# Patient Record
Sex: FEMALE | NOT HISPANIC OR LATINO | Employment: UNEMPLOYED | URBAN - METROPOLITAN AREA
[De-identification: names, ages, dates, MRNs, and addresses within clinical notes are randomized per-mention and may not be internally consistent; named-entity substitution may affect disease eponyms.]

---

## 2023-12-22 ENCOUNTER — ANESTHESIA EVENT (OUTPATIENT)
Dept: SURGERY | Facility: CLINIC | Age: 38
End: 2023-12-22
Payer: COMMERCIAL

## 2023-12-22 ENCOUNTER — HOSPITAL ENCOUNTER (OUTPATIENT)
Facility: CLINIC | Age: 38
Discharge: HOME | End: 2023-12-22
Attending: COLON & RECTAL SURGERY | Admitting: COLON & RECTAL SURGERY
Payer: COMMERCIAL

## 2023-12-22 ENCOUNTER — ANESTHESIA (OUTPATIENT)
Dept: SURGERY | Facility: CLINIC | Age: 38
End: 2023-12-22
Payer: COMMERCIAL

## 2023-12-22 VITALS
HEART RATE: 76 BPM | WEIGHT: 136.1 LBS | BODY MASS INDEX: 23.23 KG/M2 | SYSTOLIC BLOOD PRESSURE: 106 MMHG | DIASTOLIC BLOOD PRESSURE: 66 MMHG | RESPIRATION RATE: 13 BRPM | HEIGHT: 64 IN | OXYGEN SATURATION: 99 % | TEMPERATURE: 98.5 F

## 2023-12-22 DIAGNOSIS — K61.0 PERIANAL ABSCESS: Primary | ICD-10-CM

## 2023-12-22 LAB
GRAM STAIN RESULT: ABNORMAL

## 2023-12-22 PROCEDURE — 250N000009 HC RX 250: Performed by: NURSE ANESTHETIST, CERTIFIED REGISTERED

## 2023-12-22 PROCEDURE — 258N000003 HC RX IP 258 OP 636: Performed by: NURSE ANESTHETIST, CERTIFIED REGISTERED

## 2023-12-22 PROCEDURE — 370N000017 HC ANESTHESIA TECHNICAL FEE, PER MIN: Performed by: COLON & RECTAL SURGERY

## 2023-12-22 PROCEDURE — 87075 CULTR BACTERIA EXCEPT BLOOD: CPT | Performed by: COLON & RECTAL SURGERY

## 2023-12-22 PROCEDURE — 250N000009 HC RX 250: Performed by: COLON & RECTAL SURGERY

## 2023-12-22 PROCEDURE — 250N000025 HC SEVOFLURANE, PER MIN: Performed by: COLON & RECTAL SURGERY

## 2023-12-22 PROCEDURE — 999N000141 HC STATISTIC PRE-PROCEDURE NURSING ASSESSMENT: Performed by: COLON & RECTAL SURGERY

## 2023-12-22 PROCEDURE — 272N000001 HC OR GENERAL SUPPLY STERILE: Performed by: COLON & RECTAL SURGERY

## 2023-12-22 PROCEDURE — 250N000011 HC RX IP 250 OP 636: Performed by: NURSE ANESTHETIST, CERTIFIED REGISTERED

## 2023-12-22 PROCEDURE — 87070 CULTURE OTHR SPECIMN AEROBIC: CPT | Performed by: COLON & RECTAL SURGERY

## 2023-12-22 PROCEDURE — 360N000075 HC SURGERY LEVEL 2, PER MIN: Performed by: COLON & RECTAL SURGERY

## 2023-12-22 PROCEDURE — 87077 CULTURE AEROBIC IDENTIFY: CPT | Performed by: COLON & RECTAL SURGERY

## 2023-12-22 PROCEDURE — 87205 SMEAR GRAM STAIN: CPT | Performed by: COLON & RECTAL SURGERY

## 2023-12-22 PROCEDURE — 250N000011 HC RX IP 250 OP 636: Performed by: COLON & RECTAL SURGERY

## 2023-12-22 PROCEDURE — 250N000013 HC RX MED GY IP 250 OP 250 PS 637: Performed by: COLON & RECTAL SURGERY

## 2023-12-22 PROCEDURE — 710N000009 HC RECOVERY PHASE 1, LEVEL 1, PER MIN: Performed by: COLON & RECTAL SURGERY

## 2023-12-22 PROCEDURE — 710N000012 HC RECOVERY PHASE 2, PER MINUTE: Performed by: COLON & RECTAL SURGERY

## 2023-12-22 RX ORDER — ONDANSETRON 4 MG/1
4 TABLET, ORALLY DISINTEGRATING ORAL EVERY 30 MIN PRN
Status: DISCONTINUED | OUTPATIENT
Start: 2023-12-22 | End: 2023-12-22 | Stop reason: HOSPADM

## 2023-12-22 RX ORDER — HYDROMORPHONE HCL IN WATER/PF 6 MG/30 ML
0.4 PATIENT CONTROLLED ANALGESIA SYRINGE INTRAVENOUS EVERY 5 MIN PRN
Status: DISCONTINUED | OUTPATIENT
Start: 2023-12-22 | End: 2023-12-22 | Stop reason: HOSPADM

## 2023-12-22 RX ORDER — OXYCODONE HYDROCHLORIDE 5 MG/1
5 TABLET ORAL EVERY 4 HOURS PRN
Qty: 10 TABLET | Refills: 0 | Status: SHIPPED | OUTPATIENT
Start: 2023-12-22

## 2023-12-22 RX ORDER — BUPIVACAINE HYDROCHLORIDE 2.5 MG/ML
INJECTION, SOLUTION INFILTRATION; PERINEURAL PRN
Status: DISCONTINUED | OUTPATIENT
Start: 2023-12-22 | End: 2023-12-22 | Stop reason: HOSPADM

## 2023-12-22 RX ORDER — ONDANSETRON 2 MG/ML
INJECTION INTRAMUSCULAR; INTRAVENOUS PRN
Status: DISCONTINUED | OUTPATIENT
Start: 2023-12-22 | End: 2023-12-22

## 2023-12-22 RX ORDER — SODIUM CHLORIDE, SODIUM LACTATE, POTASSIUM CHLORIDE, CALCIUM CHLORIDE 600; 310; 30; 20 MG/100ML; MG/100ML; MG/100ML; MG/100ML
INJECTION, SOLUTION INTRAVENOUS CONTINUOUS
Status: DISCONTINUED | OUTPATIENT
Start: 2023-12-22 | End: 2023-12-22 | Stop reason: HOSPADM

## 2023-12-22 RX ORDER — OXYCODONE HYDROCHLORIDE 5 MG/1
5 CAPSULE ORAL EVERY 4 HOURS PRN
Status: ON HOLD | COMMUNITY
End: 2023-12-22

## 2023-12-22 RX ORDER — FENTANYL CITRATE 50 UG/ML
25 INJECTION, SOLUTION INTRAMUSCULAR; INTRAVENOUS EVERY 5 MIN PRN
Status: DISCONTINUED | OUTPATIENT
Start: 2023-12-22 | End: 2023-12-22 | Stop reason: HOSPADM

## 2023-12-22 RX ORDER — DEXAMETHASONE SODIUM PHOSPHATE 4 MG/ML
INJECTION, SOLUTION INTRA-ARTICULAR; INTRALESIONAL; INTRAMUSCULAR; INTRAVENOUS; SOFT TISSUE PRN
Status: DISCONTINUED | OUTPATIENT
Start: 2023-12-22 | End: 2023-12-22

## 2023-12-22 RX ORDER — HYDROMORPHONE HCL IN WATER/PF 6 MG/30 ML
0.2 PATIENT CONTROLLED ANALGESIA SYRINGE INTRAVENOUS EVERY 5 MIN PRN
Status: DISCONTINUED | OUTPATIENT
Start: 2023-12-22 | End: 2023-12-22 | Stop reason: HOSPADM

## 2023-12-22 RX ORDER — OXYCODONE HYDROCHLORIDE 5 MG/1
5 TABLET ORAL
Status: COMPLETED | OUTPATIENT
Start: 2023-12-22 | End: 2023-12-22

## 2023-12-22 RX ORDER — FENTANYL CITRATE 50 UG/ML
INJECTION, SOLUTION INTRAMUSCULAR; INTRAVENOUS PRN
Status: DISCONTINUED | OUTPATIENT
Start: 2023-12-22 | End: 2023-12-22

## 2023-12-22 RX ORDER — ONDANSETRON 2 MG/ML
4 INJECTION INTRAMUSCULAR; INTRAVENOUS EVERY 30 MIN PRN
Status: DISCONTINUED | OUTPATIENT
Start: 2023-12-22 | End: 2023-12-22 | Stop reason: HOSPADM

## 2023-12-22 RX ORDER — LIDOCAINE HYDROCHLORIDE 20 MG/ML
INJECTION, SOLUTION INFILTRATION; PERINEURAL PRN
Status: DISCONTINUED | OUTPATIENT
Start: 2023-12-22 | End: 2023-12-22

## 2023-12-22 RX ORDER — FENTANYL CITRATE 50 UG/ML
50 INJECTION, SOLUTION INTRAMUSCULAR; INTRAVENOUS EVERY 5 MIN PRN
Status: DISCONTINUED | OUTPATIENT
Start: 2023-12-22 | End: 2023-12-22 | Stop reason: HOSPADM

## 2023-12-22 RX ORDER — SODIUM CHLORIDE, SODIUM LACTATE, POTASSIUM CHLORIDE, CALCIUM CHLORIDE 600; 310; 30; 20 MG/100ML; MG/100ML; MG/100ML; MG/100ML
INJECTION, SOLUTION INTRAVENOUS CONTINUOUS PRN
Status: DISCONTINUED | OUTPATIENT
Start: 2023-12-22 | End: 2023-12-22

## 2023-12-22 RX ORDER — MAGNESIUM HYDROXIDE 1200 MG/15ML
LIQUID ORAL PRN
Status: DISCONTINUED | OUTPATIENT
Start: 2023-12-22 | End: 2023-12-22 | Stop reason: HOSPADM

## 2023-12-22 RX ORDER — PROPOFOL 10 MG/ML
INJECTION, EMULSION INTRAVENOUS PRN
Status: DISCONTINUED | OUTPATIENT
Start: 2023-12-22 | End: 2023-12-22

## 2023-12-22 RX ADMIN — LIDOCAINE HYDROCHLORIDE 30 MG: 20 INJECTION, SOLUTION INFILTRATION; PERINEURAL at 15:21

## 2023-12-22 RX ADMIN — FENTANYL CITRATE 100 MCG: 50 INJECTION INTRAMUSCULAR; INTRAVENOUS at 15:21

## 2023-12-22 RX ADMIN — SODIUM CHLORIDE, POTASSIUM CHLORIDE, SODIUM LACTATE AND CALCIUM CHLORIDE: 600; 310; 30; 20 INJECTION, SOLUTION INTRAVENOUS at 15:16

## 2023-12-22 RX ADMIN — ONDANSETRON 4 MG: 2 INJECTION INTRAMUSCULAR; INTRAVENOUS at 15:37

## 2023-12-22 RX ADMIN — Medication 40 MG: at 15:21

## 2023-12-22 RX ADMIN — OXYCODONE HYDROCHLORIDE 5 MG: 5 TABLET ORAL at 18:46

## 2023-12-22 RX ADMIN — MIDAZOLAM 2 MG: 1 INJECTION INTRAMUSCULAR; INTRAVENOUS at 15:16

## 2023-12-22 RX ADMIN — DEXAMETHASONE SODIUM PHOSPHATE 4 MG: 4 INJECTION, SOLUTION INTRA-ARTICULAR; INTRALESIONAL; INTRAMUSCULAR; INTRAVENOUS; SOFT TISSUE at 15:21

## 2023-12-22 RX ADMIN — PROPOFOL 180 MG: 10 INJECTION, EMULSION INTRAVENOUS at 15:21

## 2023-12-22 ASSESSMENT — ACTIVITIES OF DAILY LIVING (ADL)
ADLS_ACUITY_SCORE: 35
ADLS_ACUITY_SCORE: 35

## 2023-12-22 NOTE — OP NOTE
OPERATIVE NOTE    PERIOPERATIVE DIAGNOSIS: Right anterior lateral issue rectal abscess    POSTOPERATIVE DIAGNOSIS: Same     PROCEDURE: Exam under anesthesia, drainage of abscess     SURGEON:  Britni Bond MD     ANESTHESIA: General endotracheal anesthesia and 20 cc of 1% lidocaine with sodium bicarb and 1-100,000 epinephrine mixed with 20 cc of quarter percent Marcaine     INDICATIONS FOR PROCEDURE: Jodee is a 38-year-old woman with a history of some sort of hemorrhoid procedure this was complicated by an abscess and has had recurrent abscess in the right anterior lateral several times.  She was doing well for about a year but 3 days ago began having pain epigastric pain and swelling.  She attempted to drain this herself at home and was unsuccessful.  She was seen in urgent care and recommended see colorectal surgery.  I saw her this morning she had a very sensitive swollen area on the right anterior that she did not tolerate an exam in the office.  I recommended an exam under anesthesia with likely drainage of abscess, possible fistulotomy, possible seton.  We reviewed the risks of bleeding, infection, need for further procedures, alteration of bowel control, and expected recovery.  She understood and wished to proceed.     FINDINGS: There was a roughly 3 cm right anterior abscess.  The purulence was sent for wound culture.     DESCRIPTION OF PROCEDURE: After informed consent was obtained patient was taken the operating room and positioned for intubation on the cart.  She was intubated without difficulty.  She was then positioned on the operating table in the prone jackknife position with pressure points padded.  The perianal region was taped prepped and draped in the usual fashion.  After appropriate timeout palpation revealed a roughly 3 cm abscess in the right anterior quadrant.  There was scarring and slight bruising over this area.  Digital rectal exam did not reveal any evidence of mass or lesion.  There was  no stenosis or masses.  There was fullness on the right anterior.  Caromna bivalve was inserted revealing some slight scarring but no obvious internal opening.    An 18-gauge needle was used to aspirate over the area of maximal induration with easy return of about 2 cc of very thick purulent material.  This was sent for culture.  I then made a roughly 1 x 1 cm cruciate incision which then dropped into the 3 cm cavity.  Gentle pressure cause return of additional thick purulent material.  The wound was irrigated out with several 100 cc of saline.  The effluent was clear.  Gentle probing within the cavity did not reveal any obvious fistula tract.  There was no internal opening or purulence within the anal canal.     We assessed the wound which had excellent hemostasis and no additional purulence was noted.  I then injected a total of 42 cc of the above-mentioned mixture.    Dry gauze dressing was placed externally.    COMPLICATIONS: No immediate complications    SPECIMENS: Abscess cavity fluid for culture.    EBL: Less than 2 cc     Britni Bond MD

## 2023-12-22 NOTE — ANESTHESIA PROCEDURE NOTES
Airway       Patient location during procedure: OR       Procedure Start/Stop Times: 12/22/2023 3:23 PM  Staff -        CRNA: Yosvany Ramos APRN CRNA       Performed By: CRNA  Consent for Airway        Urgency: elective  Indications and Patient Condition       Indications for airway management: tootie-procedural       Induction type:intravenous       Mask difficulty assessment: 1 - vent by mask    Final Airway Details       Final airway type: endotracheal airway       Successful airway: ETT - single and Oral  Endotracheal Airway Details        ETT size (mm): 7.0       Cuffed: yes       Successful intubation technique: direct laryngoscopy       DL Blade Type: Silva 2       Grade View of Cords: 1       Adjucts: stylet       Position: Right       Measured from: gums/teeth       Secured at (cm): 22       Bite block used: None    Post intubation assessment        Placement verified by: capnometry, equal breath sounds and chest rise        Number of attempts at approach: 1       Number of other approaches attempted: 0       Secured with: tape       Ease of procedure: easy       Dentition: Intact    Medication(s) Administered   Medication Administration Time: 12/22/2023 3:23 PM

## 2023-12-22 NOTE — DISCHARGE INSTRUCTIONS
HOME CARE FOLLOWING MINOR SURGERY        DRESSING  Keep dressing dry and in place until your doctor instructs you to remove the dressing.    DRAINAGE  There should be minimal drainage. If bleeding should occur and soaks through the dressing apply a sterile, dry dressing over it and tape in place. If bleeding persists, apply gentle, steady pressure with your hand over the dressing for 5 minutes. If the bleeding does not stop, call your doctor.      NOTIFY YOUR PHYSICIAN IF YOU HAVE ANY OF THE FOLLOWING SYMPTOMS:    Fever greater than 101 degrees  Excessive bleeding or drainage  Disruption of the skin closure  Swelling, redness or excessive tenderness at the site  Severe pain  Drainage that is green, yellow, thick white or has a bad odor    DR. ELSI BOYLE M.D.  CLINIC PHONE NUMBER:  [phone]  [facility]     GENERAL ANESTHESIA OR SEDATION ADULT DISCHARGE INSTRUCTIONS   SPECIAL PRECAUTIONS FOR 24 HOURS AFTER SURGERY    IT IS NOT UNUSUAL TO FEEL LIGHT-HEADED OR FAINT, UP TO 24 HOURS AFTER SURGERY OR WHILE TAKING PAIN MEDICATION.  IF YOU HAVE THESE SYMPTOMS; SIT FOR A FEW MINUTES BEFORE STANDING AND HAVE SOMEONE ASSIST YOU WHEN YOU GET UP TO WALK OR USE THE BATHROOM.    YOU SHOULD REST AND RELAX FOR THE NEXT 24 HOURS AND YOU MUST MAKE ARRANGEMENTS TO HAVE SOMEONE STAY WITH YOU FOR AT LEAST 24 HOURS AFTER YOUR DISCHARGE.  AVOID HAZARDOUS AND STRENUOUS ACTIVITIES.  DO NOT MAKE IMPORTANT DECISIONS FOR 24 HOURS.    DO NOT DRIVE ANY VEHICLE OR OPERATE MECHANICAL EQUIPMENT FOR 24 HOURS FOLLOWING THE END OF YOUR SURGERY.  EVEN THOUGH YOU MAY FEEL NORMAL, YOUR REACTIONS MAY BE AFFECTED BY THE MEDICATION YOU HAVE RECEIVED.    DO NOT DRINK ALCOHOLIC BEVERAGES FOR 24 HOURS FOLLOWING YOUR SURGERY.    DRINK CLEAR LIQUIDS (APPLE JUICE, GINGER ALE, 7-UP, BROTH, ETC.).  PROGRESS TO YOUR REGULAR DIET AS YOU FEEL ABLE.    YOU MAY HAVE A DRY MOUTH, A SORE THROAT, MUSCLES ACHES OR TROUBLE SLEEPING.  THESE SHOULD  GO AWAY AFTER 24 HOURS.    CALL YOUR DOCTOR FOR ANY OF THE FOLLOWING:  SIGNS OF INFECTION (FEVER, GROWING TENDERNESS AT THE SURGERY SITE, A LARGE AMOUNT OF DRAINAGE OR BLEEDING, SEVERE PAIN, FOUL-SMELLING DRAINAGE, REDNESS OR SWELLING.    IT HAS BEEN OVER 8 TO 10 HOURS SINCE SURGERY AND YOU ARE STILL NOT ABLE TO URINATE (PASS WATER).

## 2023-12-22 NOTE — ANESTHESIA PREPROCEDURE EVALUATION
"Anesthesia Pre-Procedure Evaluation    Patient: Jodee Burdick   MRN: [id number] : 1985        Procedure : Procedure(s):  EXAM UNDER ANESTHESIA, DRAINAGE OF RECTAL ABCESS, POSSIBLE SETON  PLACEMENT OF SETON RECTUM          No past medical history on file.   No past surgical history on file.   Not on File   Social History     Tobacco Use    Smoking status: Not on file    Smokeless tobacco: Not on file   Substance Use Topics    Alcohol use: Not on file      Wt Readings from Last 1 Encounters:   23 61.7 kg (136 lb 1.6 oz)        Anesthesia Evaluation   Pt has had prior anesthetic. Type: General.    No history of anesthetic complications       ROS/MED HX  ENT/Pulmonary:  - neg pulmonary ROS     Neurologic:  - neg neurologic ROS     Cardiovascular:  - neg cardiovascular ROS     METS/Exercise Tolerance:     Hematologic:  - neg hematologic  ROS     Musculoskeletal:  - neg musculoskeletal ROS     GI/Hepatic:  - neg GI/hepatic ROS     Renal/Genitourinary:  - neg Renal ROS     Endo:  - neg endo ROS     Psychiatric/Substance Use:  - neg psychiatric ROS     Infectious Disease:  - neg infectious disease ROS     Malignancy:  - neg malignancy ROS     Other:            Physical Exam    Airway        Mallampati: II   TM distance: > 3 FB   Neck ROM: full   Mouth opening: > 3 cm    Respiratory Devices and Support         Dental       (+) Minor Abnormalities - some fillings, tiny chips      Cardiovascular   cardiovascular exam normal          Pulmonary   pulmonary exam normal                OUTSIDE LABS:  CBC: No results found for: \"WBC\", \"HGB\", \"HCT\", \"PLT\"  BMP: No results found for: \"NA\", \"POTASSIUM\", \"CHLORIDE\", \"CO2\", \"BUN\", \"CR\", \"GLC\"  COAGS: No results found for: \"PTT\", \"INR\", \"FIBR\"  POC: No results found for: \"BGM\", \"HCG\", \"HCGS\"  HEPATIC: No results found for: \"ALBUMIN\", \"PROTTOTAL\", \"ALT\", \"AST\", \"GGT\", \"ALKPHOS\", \"BILITOTAL\", \"BILIDIRECT\", \"TASNEEM\"  OTHER: No results found for: \"PH\", \"LACT\", \"A1C\", \"BENI\", " "\"PHOS\", \"MAG\", \"LIPASE\", \"AMYLASE\", \"TSH\", \"T4\", \"T3\", \"CRP\", \"SED\"    Anesthesia Plan    ASA Status:  1       Anesthesia Type: General.     - Airway: ETT   Induction: Intravenous.   Maintenance: Balanced.        Consents    Anesthesia Plan(s) and associated risks, benefits, and realistic alternatives discussed. Questions answered and patient/representative(s) expressed understanding.     - Discussed:     - Discussed with:  Patient      - Extended Intubation/Ventilatory Support Discussed: No.      - Patient is DNR/DNI Status: No     Use of blood products discussed: No .     Postoperative Care    Pain management: IV analgesics, Oral pain medications, Multi-modal analgesia.   PONV prophylaxis: Ondansetron (or other 5HT-3), Dexamethasone or Solumedrol     Comments:               Ray Butt MD    I have reviewed the pertinent notes and labs in the chart from the past 30 days and (re)examined the patient.  Any updates or changes from those notes are reflected in this note.                  "

## 2023-12-22 NOTE — ANESTHESIA POSTPROCEDURE EVALUATION
Patient: Manar S Nasr    Procedure: Procedure(s):  EXAM UNDER ANESTHESIA, DRAINAGE OF RECTAL ABCESS       Anesthesia Type:  General    Note:  Disposition: Outpatient   Postop Pain Control: Uneventful            Sign Out: Well controlled pain   PONV: No   Neuro/Psych: Uneventful            Sign Out: Acceptable/Baseline neuro status   Airway/Respiratory: Uneventful            Sign Out: Acceptable/Baseline resp. status   CV/Hemodynamics: Uneventful            Sign Out: Acceptable CV status; No obvious hypovolemia; No obvious fluid overload   Other NRE: NONE   DID A NON-ROUTINE EVENT OCCUR? No           Last vitals:  Vitals Value Taken Time   /56 12/22/23 1620   Temp     Pulse 78 12/22/23 1623   Resp 17 12/22/23 1623   SpO2 99 % 12/22/23 1623   Vitals shown include unfiled device data.    Electronically Signed By: Ray Butt MD  December 22, 2023  4:24 PM

## 2023-12-22 NOTE — ANESTHESIA CARE TRANSFER NOTE
Patient: Manar S Nasr    Procedure: Procedure(s):  EXAM UNDER ANESTHESIA, DRAINAGE OF RECTAL ABCESS       Diagnosis: Rectal abscess [K61.1]  Diagnosis Additional Information: No value filed.    Anesthesia Type:   General     Note:    Oropharynx: oropharynx clear of all foreign objects  Level of Consciousness: awake  Oxygen Supplementation: face mask  Level of Supplemental Oxygen (L/min / FiO2): 6  Independent Airway: airway patency satisfactory and stable  Dentition: dentition unchanged  Vital Signs Stable: post-procedure vital signs reviewed and stable  Report to RN Given: handoff report given  Patient transferred to: PACU    Handoff Report: Identifed the Patient, Identified the Reponsible Provider, Reviewed the pertinent medical history, Discussed the surgical course, Reviewed Intra-OP anesthesia mangement and issues during anesthesia, Set expectations for post-procedure period and Allowed opportunity for questions and acknowledgement of understanding      Vitals:  Vitals Value Taken Time   BP     Temp     Pulse 77 12/22/23 1548   Resp 32 12/22/23 1548   SpO2 100 % 12/22/23 1548   Vitals shown include unfiled device data.    Electronically Signed By: ASHA Fields CRNA  December 22, 2023  3:50 PM

## 2023-12-24 LAB
BACTERIA ABSC ANAEROBE+AEROBE CULT: ABNORMAL
BACTERIA ABSC ANAEROBE+AEROBE CULT: ABNORMAL
BACTERIA ABSC ANAEROBE+AEROBE CULT: NORMAL

## 2024-01-10 NOTE — H&P
Please see office not from 12/22/23.  Britni Bond MD  [facility]  [address], [address]  [place] [address]  T: [phone]  F: [phone]   [email or web address]

## 2025-01-22 ENCOUNTER — APPOINTMENT (OUTPATIENT)
Dept: CT IMAGING | Facility: CLINIC | Age: 40
End: 2025-01-22
Attending: PHYSICIAN ASSISTANT
Payer: COMMERCIAL

## 2025-01-22 ENCOUNTER — HOSPITAL ENCOUNTER (INPATIENT)
Facility: CLINIC | Age: 40
Setting detail: SURGERY ADMIT
End: 2025-01-22
Attending: SURGERY | Admitting: SURGERY
Payer: COMMERCIAL

## 2025-01-22 ENCOUNTER — HOSPITAL ENCOUNTER (OUTPATIENT)
Facility: CLINIC | Age: 40
Setting detail: OBSERVATION
LOS: 1 days | Discharge: HOME | End: 2025-01-23
Attending: PHYSICIAN ASSISTANT | Admitting: INTERNAL MEDICINE
Payer: COMMERCIAL

## 2025-01-22 ENCOUNTER — ANESTHESIA EVENT (OUTPATIENT)
Dept: SURGERY | Facility: CLINIC | Age: 40
End: 2025-01-22
Payer: COMMERCIAL

## 2025-01-22 ENCOUNTER — ANESTHESIA (OUTPATIENT)
Dept: SURGERY | Facility: CLINIC | Age: 40
End: 2025-01-22
Payer: COMMERCIAL

## 2025-01-22 DIAGNOSIS — K61.1 PERIRECTAL ABSCESS: ICD-10-CM

## 2025-01-22 DIAGNOSIS — R71.8 MICROCYTOSIS: Primary | ICD-10-CM

## 2025-01-22 LAB
ANION GAP SERPL CALCULATED.3IONS-SCNC: 11 MMOL/L (ref 7–15)
BASOPHILS # BLD AUTO: 0 10E3/UL (ref 0–0.2)
BASOPHILS NFR BLD AUTO: 0 %
BUN SERPL-MCNC: 12 MG/DL (ref 6–20)
CALCIUM SERPL-MCNC: 9 MG/DL (ref 8.8–10.4)
CHLORIDE SERPL-SCNC: 103 MMOL/L (ref 98–107)
CREAT SERPL-MCNC: 0.58 MG/DL (ref 0.51–0.95)
EGFRCR SERPLBLD CKD-EPI 2021: >90 ML/MIN/1.73M2
EOSINOPHIL # BLD AUTO: 0.2 10E3/UL (ref 0–0.7)
EOSINOPHIL NFR BLD AUTO: 2 %
ERYTHROCYTE [DISTWIDTH] IN BLOOD BY AUTOMATED COUNT: 16.3 % (ref 10–15)
GLUCOSE SERPL-MCNC: 88 MG/DL (ref 70–99)
HCG UR QL: NEGATIVE
HCO3 SERPL-SCNC: 22 MMOL/L (ref 22–29)
HCT VFR BLD AUTO: 31 % (ref 35–47)
HGB BLD-MCNC: 9.8 G/DL (ref 11.7–15.7)
IMM GRANULOCYTES # BLD: 0 10E3/UL
IMM GRANULOCYTES NFR BLD: 0 %
LYMPHOCYTES # BLD AUTO: 1.9 10E3/UL (ref 0.8–5.3)
LYMPHOCYTES NFR BLD AUTO: 18 %
MCH RBC QN AUTO: 22.5 PG (ref 26.5–33)
MCHC RBC AUTO-ENTMCNC: 31.6 G/DL (ref 31.5–36.5)
MCV RBC AUTO: 71 FL (ref 78–100)
MONOCYTES # BLD AUTO: 0.8 10E3/UL (ref 0–1.3)
MONOCYTES NFR BLD AUTO: 7 %
NEUTROPHILS # BLD AUTO: 7.5 10E3/UL (ref 1.6–8.3)
NEUTROPHILS NFR BLD AUTO: 72 %
NRBC # BLD AUTO: 0 10E3/UL
NRBC BLD AUTO-RTO: 0 /100
PLATELET # BLD AUTO: 187 10E3/UL (ref 150–450)
POTASSIUM SERPL-SCNC: 3.9 MMOL/L (ref 3.4–5.3)
RBC # BLD AUTO: 4.36 10E6/UL (ref 3.8–5.2)
SODIUM SERPL-SCNC: 136 MMOL/L (ref 135–145)
WBC # BLD AUTO: 10.3 10E3/UL (ref 4–11)

## 2025-01-22 PROCEDURE — 82565 ASSAY OF CREATININE: CPT | Performed by: PHYSICIAN ASSISTANT

## 2025-01-22 PROCEDURE — 96374 THER/PROPH/DIAG INJ IV PUSH: CPT

## 2025-01-22 PROCEDURE — 36415 COLL VENOUS BLD VENIPUNCTURE: CPT | Performed by: PHYSICIAN ASSISTANT

## 2025-01-22 PROCEDURE — 81025 URINE PREGNANCY TEST: CPT | Performed by: PHYSICIAN ASSISTANT

## 2025-01-22 PROCEDURE — 88305 TISSUE EXAM BY PATHOLOGIST: CPT | Mod: TC | Performed by: SURGERY

## 2025-01-22 PROCEDURE — 96375 TX/PRO/DX INJ NEW DRUG ADDON: CPT

## 2025-01-22 PROCEDURE — 99285 EMERGENCY DEPT VISIT HI MDM: CPT | Mod: 25

## 2025-01-22 PROCEDURE — 72193 CT PELVIS W/DYE: CPT

## 2025-01-22 PROCEDURE — 272N000001 HC OR GENERAL SUPPLY STERILE: Performed by: SURGERY

## 2025-01-22 PROCEDURE — 250N000011 HC RX IP 250 OP 636: Performed by: NURSE ANESTHETIST, CERTIFIED REGISTERED

## 2025-01-22 PROCEDURE — 370N000017 HC ANESTHESIA TECHNICAL FEE, PER MIN: Performed by: SURGERY

## 2025-01-22 PROCEDURE — 88304 TISSUE EXAM BY PATHOLOGIST: CPT | Mod: 26 | Performed by: PATHOLOGY

## 2025-01-22 PROCEDURE — 85025 COMPLETE CBC W/AUTO DIFF WBC: CPT | Performed by: PHYSICIAN ASSISTANT

## 2025-01-22 PROCEDURE — 96376 TX/PRO/DX INJ SAME DRUG ADON: CPT

## 2025-01-22 PROCEDURE — 250N000009 HC RX 250: Performed by: NURSE ANESTHETIST, CERTIFIED REGISTERED

## 2025-01-22 PROCEDURE — 250N000025 HC SEVOFLURANE, PER MIN: Performed by: SURGERY

## 2025-01-22 PROCEDURE — 99222 1ST HOSP IP/OBS MODERATE 55: CPT | Mod: 57

## 2025-01-22 PROCEDURE — 250N000013 HC RX MED GY IP 250 OP 250 PS 637: Performed by: PHYSICIAN ASSISTANT

## 2025-01-22 PROCEDURE — 360N000075 HC SURGERY LEVEL 2, PER MIN: Performed by: SURGERY

## 2025-01-22 PROCEDURE — 120N000004 HC R&B MS OVERFLOW

## 2025-01-22 PROCEDURE — 250N000011 HC RX IP 250 OP 636: Performed by: PHYSICIAN ASSISTANT

## 2025-01-22 PROCEDURE — 999N000141 HC STATISTIC PRE-PROCEDURE NURSING ASSESSMENT: Performed by: SURGERY

## 2025-01-22 PROCEDURE — 99222 1ST HOSP IP/OBS MODERATE 55: CPT | Performed by: PHYSICIAN ASSISTANT

## 2025-01-22 PROCEDURE — 710N000009 HC RECOVERY PHASE 1, LEVEL 1, PER MIN: Performed by: SURGERY

## 2025-01-22 PROCEDURE — 258N000003 HC RX IP 258 OP 636: Performed by: ANESTHESIOLOGY

## 2025-01-22 PROCEDURE — 250N000011 HC RX IP 250 OP 636: Performed by: ANESTHESIOLOGY

## 2025-01-22 PROCEDURE — 250N000011 HC RX IP 250 OP 636: Performed by: SURGERY

## 2025-01-22 PROCEDURE — 80048 BASIC METABOLIC PNL TOTAL CA: CPT | Performed by: PHYSICIAN ASSISTANT

## 2025-01-22 PROCEDURE — G0378 HOSPITAL OBSERVATION PER HR: HCPCS

## 2025-01-22 PROCEDURE — 258N000003 HC RX IP 258 OP 636: Performed by: PHYSICIAN ASSISTANT

## 2025-01-22 RX ORDER — SODIUM CHLORIDE, SODIUM LACTATE, POTASSIUM CHLORIDE, CALCIUM CHLORIDE 600; 310; 30; 20 MG/100ML; MG/100ML; MG/100ML; MG/100ML
INJECTION, SOLUTION INTRAVENOUS CONTINUOUS
Status: DISCONTINUED | OUTPATIENT
Start: 2025-01-22 | End: 2025-01-23

## 2025-01-22 RX ORDER — IOPAMIDOL 755 MG/ML
500 INJECTION, SOLUTION INTRAVASCULAR ONCE
Status: COMPLETED | OUTPATIENT
Start: 2025-01-22 | End: 2025-01-22

## 2025-01-22 RX ORDER — HYDROMORPHONE HYDROCHLORIDE 1 MG/ML
0.5 INJECTION, SOLUTION INTRAMUSCULAR; INTRAVENOUS; SUBCUTANEOUS EVERY 30 MIN PRN
Status: DISCONTINUED | OUTPATIENT
Start: 2025-01-22 | End: 2025-01-22

## 2025-01-22 RX ORDER — ONDANSETRON 2 MG/ML
4 INJECTION INTRAMUSCULAR; INTRAVENOUS EVERY 6 HOURS PRN
Status: DISCONTINUED | OUTPATIENT
Start: 2025-01-22 | End: 2025-01-23

## 2025-01-22 RX ORDER — ALBUTEROL SULFATE 0.83 MG/ML
2.5 SOLUTION RESPIRATORY (INHALATION) EVERY 4 HOURS PRN
Status: DISCONTINUED | OUTPATIENT
Start: 2025-01-22 | End: 2025-01-23 | Stop reason: HOSPADM

## 2025-01-22 RX ORDER — LIDOCAINE 40 MG/G
CREAM TOPICAL
Status: DISCONTINUED | OUTPATIENT
Start: 2025-01-22 | End: 2025-01-22 | Stop reason: HOSPADM

## 2025-01-22 RX ORDER — AMOXICILLIN 250 MG
1 CAPSULE ORAL 2 TIMES DAILY PRN
Status: DISCONTINUED | OUTPATIENT
Start: 2025-01-22 | End: 2025-01-23 | Stop reason: HOSPADM

## 2025-01-22 RX ORDER — ONDANSETRON 2 MG/ML
INJECTION INTRAMUSCULAR; INTRAVENOUS PRN
Status: DISCONTINUED | OUTPATIENT
Start: 2025-01-22 | End: 2025-01-22

## 2025-01-22 RX ORDER — FENTANYL CITRATE 50 UG/ML
50 INJECTION, SOLUTION INTRAMUSCULAR; INTRAVENOUS EVERY 5 MIN PRN
Status: DISCONTINUED | OUTPATIENT
Start: 2025-01-22 | End: 2025-01-23 | Stop reason: HOSPADM

## 2025-01-22 RX ORDER — HYDROMORPHONE HYDROCHLORIDE 1 MG/ML
0.5 INJECTION, SOLUTION INTRAMUSCULAR; INTRAVENOUS; SUBCUTANEOUS EVERY 5 MIN PRN
Status: DISCONTINUED | OUTPATIENT
Start: 2025-01-22 | End: 2025-01-23 | Stop reason: HOSPADM

## 2025-01-22 RX ORDER — KETOROLAC TROMETHAMINE 15 MG/ML
15 INJECTION, SOLUTION INTRAMUSCULAR; INTRAVENOUS EVERY 6 HOURS
Status: DISCONTINUED | OUTPATIENT
Start: 2025-01-22 | End: 2025-01-23 | Stop reason: HOSPADM

## 2025-01-22 RX ORDER — ONDANSETRON 2 MG/ML
4 INJECTION INTRAMUSCULAR; INTRAVENOUS ONCE
Status: DISCONTINUED | OUTPATIENT
Start: 2025-01-22 | End: 2025-01-22 | Stop reason: HOSPADM

## 2025-01-22 RX ORDER — PROPOFOL 10 MG/ML
INJECTION, EMULSION INTRAVENOUS PRN
Status: DISCONTINUED | OUTPATIENT
Start: 2025-01-22 | End: 2025-01-22

## 2025-01-22 RX ORDER — ACETAMINOPHEN 650 MG/1
650 SUPPOSITORY RECTAL 3 TIMES DAILY
Status: DISCONTINUED | OUTPATIENT
Start: 2025-01-22 | End: 2025-01-22

## 2025-01-22 RX ORDER — HYDRALAZINE HYDROCHLORIDE 20 MG/ML
5-10 INJECTION INTRAMUSCULAR; INTRAVENOUS EVERY 10 MIN PRN
Status: DISCONTINUED | OUTPATIENT
Start: 2025-01-22 | End: 2025-01-23 | Stop reason: HOSPADM

## 2025-01-22 RX ORDER — DEXAMETHASONE SODIUM PHOSPHATE 4 MG/ML
INJECTION, SOLUTION INTRA-ARTICULAR; INTRALESIONAL; INTRAMUSCULAR; INTRAVENOUS; SOFT TISSUE PRN
Status: DISCONTINUED | OUTPATIENT
Start: 2025-01-22 | End: 2025-01-22

## 2025-01-22 RX ORDER — LIDOCAINE 40 MG/G
CREAM TOPICAL
Status: DISCONTINUED | OUTPATIENT
Start: 2025-01-22 | End: 2025-01-23 | Stop reason: HOSPADM

## 2025-01-22 RX ORDER — LABETALOL HYDROCHLORIDE 5 MG/ML
10 INJECTION, SOLUTION INTRAVENOUS
Status: DISCONTINUED | OUTPATIENT
Start: 2025-01-22 | End: 2025-01-23 | Stop reason: HOSPADM

## 2025-01-22 RX ORDER — FENTANYL CITRATE 50 UG/ML
INJECTION, SOLUTION INTRAMUSCULAR; INTRAVENOUS PRN
Status: DISCONTINUED | OUTPATIENT
Start: 2025-01-22 | End: 2025-01-22

## 2025-01-22 RX ORDER — ACETAMINOPHEN 325 MG/1
975 TABLET ORAL 3 TIMES DAILY
Status: DISCONTINUED | OUTPATIENT
Start: 2025-01-22 | End: 2025-01-22

## 2025-01-22 RX ORDER — ACETAMINOPHEN 500 MG
1000 TABLET ORAL EVERY 6 HOURS
Status: DISCONTINUED | OUTPATIENT
Start: 2025-01-22 | End: 2025-01-23 | Stop reason: HOSPADM

## 2025-01-22 RX ORDER — KETOROLAC TROMETHAMINE 30 MG/ML
INJECTION, SOLUTION INTRAMUSCULAR; INTRAVENOUS PRN
Status: DISCONTINUED | OUTPATIENT
Start: 2025-01-22 | End: 2025-01-22

## 2025-01-22 RX ORDER — ONDANSETRON 2 MG/ML
4 INJECTION INTRAMUSCULAR; INTRAVENOUS EVERY 30 MIN PRN
Status: DISCONTINUED | OUTPATIENT
Start: 2025-01-22 | End: 2025-01-23 | Stop reason: HOSPADM

## 2025-01-22 RX ORDER — AMOXICILLIN 250 MG
2 CAPSULE ORAL 2 TIMES DAILY PRN
Status: DISCONTINUED | OUTPATIENT
Start: 2025-01-22 | End: 2025-01-23 | Stop reason: HOSPADM

## 2025-01-22 RX ORDER — HYDROMORPHONE HYDROCHLORIDE 2 MG/1
2 TABLET ORAL EVERY 4 HOURS PRN
Status: DISCONTINUED | OUTPATIENT
Start: 2025-01-22 | End: 2025-01-23 | Stop reason: HOSPADM

## 2025-01-22 RX ORDER — FENTANYL CITRATE 50 UG/ML
25 INJECTION, SOLUTION INTRAMUSCULAR; INTRAVENOUS EVERY 5 MIN PRN
Status: DISCONTINUED | OUTPATIENT
Start: 2025-01-22 | End: 2025-01-23 | Stop reason: HOSPADM

## 2025-01-22 RX ORDER — IBUPROFEN 600 MG/1
600 TABLET, FILM COATED ORAL EVERY 6 HOURS
Status: DISCONTINUED | OUTPATIENT
Start: 2025-01-23 | End: 2025-01-23 | Stop reason: HOSPADM

## 2025-01-22 RX ORDER — NALOXONE HYDROCHLORIDE 0.4 MG/ML
0.1 INJECTION, SOLUTION INTRAMUSCULAR; INTRAVENOUS; SUBCUTANEOUS
Status: DISCONTINUED | OUTPATIENT
Start: 2025-01-22 | End: 2025-01-23 | Stop reason: HOSPADM

## 2025-01-22 RX ORDER — HYDROMORPHONE HCL IN WATER/PF 6 MG/30 ML
0.2 PATIENT CONTROLLED ANALGESIA SYRINGE INTRAVENOUS
Status: DISCONTINUED | OUTPATIENT
Start: 2025-01-22 | End: 2025-01-23 | Stop reason: HOSPADM

## 2025-01-22 RX ORDER — SODIUM CHLORIDE, SODIUM LACTATE, POTASSIUM CHLORIDE, CALCIUM CHLORIDE 600; 310; 30; 20 MG/100ML; MG/100ML; MG/100ML; MG/100ML
INJECTION, SOLUTION INTRAVENOUS CONTINUOUS
Status: DISCONTINUED | OUTPATIENT
Start: 2025-01-22 | End: 2025-01-23 | Stop reason: HOSPADM

## 2025-01-22 RX ORDER — HYDROMORPHONE HCL IN WATER/PF 6 MG/30 ML
0.2 PATIENT CONTROLLED ANALGESIA SYRINGE INTRAVENOUS
Status: DISCONTINUED | OUTPATIENT
Start: 2025-01-22 | End: 2025-01-23

## 2025-01-22 RX ORDER — PIPERACILLIN SODIUM, TAZOBACTAM SODIUM 4; .5 G/20ML; G/20ML
4.5 INJECTION, POWDER, LYOPHILIZED, FOR SOLUTION INTRAVENOUS ONCE
Status: COMPLETED | OUTPATIENT
Start: 2025-01-22 | End: 2025-01-22

## 2025-01-22 RX ORDER — LIDOCAINE HYDROCHLORIDE 20 MG/ML
INJECTION, SOLUTION INFILTRATION; PERINEURAL PRN
Status: DISCONTINUED | OUTPATIENT
Start: 2025-01-22 | End: 2025-01-22

## 2025-01-22 RX ORDER — HYDROMORPHONE HCL IN WATER/PF 6 MG/30 ML
0.4 PATIENT CONTROLLED ANALGESIA SYRINGE INTRAVENOUS
Status: DISCONTINUED | OUTPATIENT
Start: 2025-01-22 | End: 2025-01-23

## 2025-01-22 RX ORDER — HYDROMORPHONE HYDROCHLORIDE 2 MG/1
4 TABLET ORAL EVERY 4 HOURS PRN
Status: DISCONTINUED | OUTPATIENT
Start: 2025-01-22 | End: 2025-01-23 | Stop reason: HOSPADM

## 2025-01-22 RX ORDER — PROCHLORPERAZINE MALEATE 5 MG/1
10 TABLET ORAL EVERY 6 HOURS PRN
Status: DISCONTINUED | OUTPATIENT
Start: 2025-01-22 | End: 2025-01-23 | Stop reason: HOSPADM

## 2025-01-22 RX ORDER — DIPHENHYDRAMINE HYDROCHLORIDE 50 MG/ML
25 INJECTION INTRAMUSCULAR; INTRAVENOUS EVERY 6 HOURS PRN
Status: DISCONTINUED | OUTPATIENT
Start: 2025-01-22 | End: 2025-01-23 | Stop reason: HOSPADM

## 2025-01-22 RX ORDER — SODIUM CHLORIDE 9 MG/ML
INJECTION, SOLUTION INTRAVENOUS CONTINUOUS
Status: DISCONTINUED | OUTPATIENT
Start: 2025-01-22 | End: 2025-01-23

## 2025-01-22 RX ORDER — MAGNESIUM SULFATE HEPTAHYDRATE 40 MG/ML
2 INJECTION, SOLUTION INTRAVENOUS
Status: DISCONTINUED | OUTPATIENT
Start: 2025-01-22 | End: 2025-01-23 | Stop reason: HOSPADM

## 2025-01-22 RX ORDER — KETOROLAC TROMETHAMINE 15 MG/ML
15 INJECTION, SOLUTION INTRAMUSCULAR; INTRAVENOUS
Status: DISCONTINUED | OUTPATIENT
Start: 2025-01-22 | End: 2025-01-23 | Stop reason: HOSPADM

## 2025-01-22 RX ORDER — OXYCODONE HYDROCHLORIDE 5 MG/1
5 TABLET ORAL EVERY 4 HOURS PRN
Status: DISCONTINUED | OUTPATIENT
Start: 2025-01-22 | End: 2025-01-23

## 2025-01-22 RX ORDER — HYDROMORPHONE HCL IN WATER/PF 6 MG/30 ML
0.4 PATIENT CONTROLLED ANALGESIA SYRINGE INTRAVENOUS
Status: DISCONTINUED | OUTPATIENT
Start: 2025-01-22 | End: 2025-01-23 | Stop reason: HOSPADM

## 2025-01-22 RX ORDER — DEXAMETHASONE SODIUM PHOSPHATE 4 MG/ML
4 INJECTION, SOLUTION INTRA-ARTICULAR; INTRALESIONAL; INTRAMUSCULAR; INTRAVENOUS; SOFT TISSUE
Status: DISCONTINUED | OUTPATIENT
Start: 2025-01-22 | End: 2025-01-23 | Stop reason: HOSPADM

## 2025-01-22 RX ORDER — PIPERACILLIN SODIUM, TAZOBACTAM SODIUM 3; .375 G/15ML; G/15ML
3.38 INJECTION, POWDER, LYOPHILIZED, FOR SOLUTION INTRAVENOUS EVERY 6 HOURS
Status: DISCONTINUED | OUTPATIENT
Start: 2025-01-23 | End: 2025-01-23 | Stop reason: HOSPADM

## 2025-01-22 RX ORDER — ONDANSETRON 4 MG/1
4 TABLET, ORALLY DISINTEGRATING ORAL EVERY 30 MIN PRN
Status: DISCONTINUED | OUTPATIENT
Start: 2025-01-22 | End: 2025-01-23 | Stop reason: HOSPADM

## 2025-01-22 RX ORDER — SODIUM CHLORIDE, SODIUM LACTATE, POTASSIUM CHLORIDE, CALCIUM CHLORIDE 600; 310; 30; 20 MG/100ML; MG/100ML; MG/100ML; MG/100ML
INJECTION, SOLUTION INTRAVENOUS CONTINUOUS
Status: DISCONTINUED | OUTPATIENT
Start: 2025-01-22 | End: 2025-01-22 | Stop reason: HOSPADM

## 2025-01-22 RX ORDER — ONDANSETRON 4 MG/1
4 TABLET, ORALLY DISINTEGRATING ORAL EVERY 6 HOURS PRN
Status: DISCONTINUED | OUTPATIENT
Start: 2025-01-22 | End: 2025-01-23

## 2025-01-22 RX ADMIN — DEXAMETHASONE SODIUM PHOSPHATE 8 MG: 4 INJECTION, SOLUTION INTRA-ARTICULAR; INTRALESIONAL; INTRAMUSCULAR; INTRAVENOUS; SOFT TISSUE at 22:11

## 2025-01-22 RX ADMIN — PROPOFOL 120 MG: 10 INJECTION, EMULSION INTRAVENOUS at 22:11

## 2025-01-22 RX ADMIN — SODIUM CHLORIDE: 9 INJECTION, SOLUTION INTRAVENOUS at 18:27

## 2025-01-22 RX ADMIN — ONDANSETRON 4 MG: 2 INJECTION INTRAMUSCULAR; INTRAVENOUS at 22:11

## 2025-01-22 RX ADMIN — HYDROMORPHONE HYDROCHLORIDE 0.4 MG: 0.2 INJECTION, SOLUTION INTRAMUSCULAR; INTRAVENOUS; SUBCUTANEOUS at 19:20

## 2025-01-22 RX ADMIN — HYDROMORPHONE HYDROCHLORIDE 0.5 MG: 1 INJECTION, SOLUTION INTRAMUSCULAR; INTRAVENOUS; SUBCUTANEOUS at 17:41

## 2025-01-22 RX ADMIN — ROCURONIUM BROMIDE 40 MG: 50 INJECTION, SOLUTION INTRAVENOUS at 22:11

## 2025-01-22 RX ADMIN — ONDANSETRON 4 MG: 2 INJECTION INTRAMUSCULAR; INTRAVENOUS at 23:40

## 2025-01-22 RX ADMIN — KETOROLAC TROMETHAMINE 15 MG: 30 INJECTION, SOLUTION INTRAMUSCULAR at 22:38

## 2025-01-22 RX ADMIN — SODIUM CHLORIDE, POTASSIUM CHLORIDE, SODIUM LACTATE AND CALCIUM CHLORIDE: 600; 310; 30; 20 INJECTION, SOLUTION INTRAVENOUS at 22:10

## 2025-01-22 RX ADMIN — HYDROMORPHONE HYDROCHLORIDE 0.5 MG: 1 INJECTION, SOLUTION INTRAMUSCULAR; INTRAVENOUS; SUBCUTANEOUS at 14:40

## 2025-01-22 RX ADMIN — ACETAMINOPHEN 975 MG: 325 TABLET, FILM COATED ORAL at 20:26

## 2025-01-22 RX ADMIN — IOPAMIDOL 63 ML: 755 INJECTION, SOLUTION INTRAVENOUS at 15:20

## 2025-01-22 RX ADMIN — LIDOCAINE HYDROCHLORIDE 50 MG: 20 INJECTION, SOLUTION INFILTRATION; PERINEURAL at 22:11

## 2025-01-22 RX ADMIN — FENTANYL CITRATE 100 MCG: 50 INJECTION INTRAMUSCULAR; INTRAVENOUS at 22:11

## 2025-01-22 RX ADMIN — SUGAMMADEX 200 MG: 100 INJECTION, SOLUTION INTRAVENOUS at 22:39

## 2025-01-22 RX ADMIN — PIPERACILLIN AND TAZOBACTAM 4.5 G: 4; .5 INJECTION, POWDER, FOR SOLUTION INTRAVENOUS at 17:44

## 2025-01-22 ASSESSMENT — COLUMBIA-SUICIDE SEVERITY RATING SCALE - C-SSRS
1. IN THE PAST MONTH, HAVE YOU WISHED YOU WERE DEAD OR WISHED YOU COULD GO TO SLEEP AND NOT WAKE UP?: NO
6. HAVE YOU EVER DONE ANYTHING, STARTED TO DO ANYTHING, OR PREPARED TO DO ANYTHING TO END YOUR LIFE?: NO
2. HAVE YOU ACTUALLY HAD ANY THOUGHTS OF KILLING YOURSELF IN THE PAST MONTH?: NO

## 2025-01-22 ASSESSMENT — ACTIVITIES OF DAILY LIVING (ADL)
ADLS_ACUITY_SCORE: 42
ADLS_ACUITY_SCORE: 42
ADLS_ACUITY_SCORE: 41
ADLS_ACUITY_SCORE: 42
ADLS_ACUITY_SCORE: 41
ADLS_ACUITY_SCORE: 41

## 2025-01-22 ASSESSMENT — LIFESTYLE VARIABLES: TOBACCO_USE: 1

## 2025-01-22 NOTE — ED NOTES
[facility]  ED Nurse Handoff Report    ED Chief complaint: Vaginal Problem  . ED Diagnosis:   Final diagnoses:   Perirectal abscess       Allergies: No Known Allergies    Code Status: Full Code    Activity level - Baseline/Home:  independent.  Activity Level - Current:   independent.   Lift room needed: No.   Bariatric: No   Needed: Yes   Isolation: No.   Infection: Not Applicable.     Respiratory status: Room air    Vital Signs (within 30 minutes):   Vitals:    01/22/25 1219   BP: 114/67   Pulse: 83   Resp: 18   Temp: 98.2  F (36.8  C)   TempSrc: Temporal   SpO2: 98%   Weight: 57 kg (125 lb 10.6 oz)       Cardiac Rhythm:  ,      Pain level:    Patient confused: No.   Patient Falls Risk: nonskid shoes/slippers when out of bed.   Elimination Status: Has voided     Patient Report - Initial Complaint: abscess.   Focused Assessment: 39 year old female with a history of perianal abscess who presents to the emergency department for a perianal problem. The patient states that 2 days ago, she began experiencing perianal pain and swelling, noting that this symptom presentation is reminiscent of a perianal abscess that she has experienced in the past in which she underwent an incision and drainage of this abscess on 12/22/23. She took ibuprofen at 0100 this morning for pain management. No purulent drainage from this abscess. No changes to stool. No black or bloody stools. No fever or chills. No abdominal pain, nausea, or vomiting. No vaginal symptoms. No urinary symptoms. No concern for pregnancy. She adds that she has not eaten today.      Abnormal Results:   Labs Ordered and Resulted from Time of ED Arrival to Time of ED Departure   CBC WITH PLATELETS AND DIFFERENTIAL - Abnormal       Result Value    WBC Count 10.3      RBC Count 4.36      Hemoglobin 9.8 (*)     Hematocrit 31.0 (*)     MCV 71 (*)     MCH 22.5 (*)     MCHC 31.6      RDW 16.3 (*)     Platelet Count 187      % Neutrophils 72       % Lymphocytes 18      % Monocytes 7      % Eosinophils 2      % Basophils 0      % Immature Granulocytes 0      NRBCs per 100 WBC 0      Absolute Neutrophils 7.5      Absolute Lymphocytes 1.9      Absolute Monocytes 0.8      Absolute Eosinophils 0.2      Absolute Basophils 0.0      Absolute Immature Granulocytes 0.0      Absolute NRBCs 0.0     BASIC METABOLIC PANEL - Normal    Sodium 136      Potassium 3.9      Chloride 103      Carbon Dioxide (CO2) 22      Anion Gap 11      Urea Nitrogen 12.0      Creatinine 0.58      GFR Estimate >90      Calcium 9.0      Glucose 88     HCG QUALITATIVE URINE - Normal    hCG Urine Qualitative Negative          CT Pelvis Soft Tissue w Contrast   Final Result   IMPRESSION:   1.  A 2.6 x 2.3 x 1.4 cm right perianal abscess. A linear tract extends superiorly from the abscess and abuts the right upper anal canal/anorectal junction. Consider a follow-up pelvic MRI to evaluate for fistula.             Treatments provided: see EMR  Family Comments: NA  OBS brochure/video discussed/provided to patient:  Yes  ED Medications:   Medications   HYDROmorphone (PF) (DILAUDID) injection 0.5 mg (0.5 mg Intravenous $Given 1/22/25 1440)   piperacillin-tazobactam (ZOSYN) 4.5 g vial to attach to  mL bag (has no administration in time range)   sodium chloride (PF) 0.9% PF flush 100 mL (56 mLs Intravenous $Given 1/22/25 1520)   iopamidol (ISOVUE-370) solution 500 mL (63 mLs Intravenous $Given 1/22/25 1520)       Drips infusing:  No  For the majority of the shift this patient was Green.   Interventions performed were NA.    Sepsis treatment initiated: No    Cares/treatment/interventions/medications to be completed following ED care: See inpatient orders    ED Nurse Name: Destiny Valenzuela RN  4:12 PM     RECEIVING UNIT ED HANDOFF REVIEW    Above ED Nurse Handoff Report was reviewed: Yes  Reviewed by: Arti Randhawa RN on January 22, 2025 at 6:29 PM   HERBER Goldberg called the ED to inform them the note was  read: Yes

## 2025-01-22 NOTE — H&P
[facility]    History and Physical - Hospitalist Service       Date of Admission:  1/22/2025    Assessment & Plan      Jodee Burdick is a 39 year old female with history of perirectal abscess drainage admitted on 1/22/2025 after being sent here by colorectal surgery for rectal pain due to abscess.     In the ED she was afebrile, heart rate 83, pressure 114/67 and breathing comfortably on room air without hypoxia.  Lab work is remarkable for normal BMP, WBC 10.3, hemoglobin 9.8 and platelet count 187.  CT pelvis shows a 2.6 x 2.3 x 1.4 cm right perianal abscess.  Colorectal is aware of her and recommended IV Zosyn and likely will take to the OR for abscess drainage this evening.  Observation admission requested    # Perirectal abscess  # Rectal pain  # History of perirectal abscess  -Patient has a history of perirectal abscess requiring drainage in 2023  -Reports increased rectal pain on 1/20 without bleeding, fever, chills, nausea, vomiting or other signs of systemic infection  -BBC is normal and she is afebrile in the ED  -CT shows a 2.6 x 2.3 x 1.4 cm right perianal abscess  -Colorectal surgery is aware of her and I am told might go to the OR this evening  -IV Zosyn  -Normal saline at 75 ml/hr  -Will have Tylenol, oxycodone and Dilaudid available for pain  -Repeat labs in AM            Diet: NPO per Anesthesia Guidelines for Procedure/Surgery Except for: Meds    DVT Prophylaxis: Pneumatic Compression Devices  Patterson Catheter: Not present  Lines: None     Cardiac Monitoring: None  Code Status:  Full code    Clinically Significant Risk Factors Present on Admission                        # Anemia: based on hgb <11                  Disposition Plan     Medically Ready for Discharge: Anticipated Tomorrow         The patient's care was discussed with the Patient and ED Consultant(s).    Irina Mcclain PA-C  Hospitalist Service  [facility]  Securely message with [facility]  (more info)  Text page via [facility] Paging/Directory     ______________________________________________________________________    Chief Complaint   Rectal pain     History is obtained from the patient    History of Present Illness   Jodee Burdick is a 39 year old female who presents with rectal pain for the past couple days.  The pain is worse with sitting on her buttocks and also when having a bowel movement.  Denies bleeding, fever, chills, nausea, vomiting, abdominal pain, diarrhea and urinary symptoms.  She has felt tired but otherwise does not feel sick.  She denies alcohol use but does smoke occasionally.      Past Medical History    No past medical history on file.    Past Surgical History   Past Surgical History:   Procedure Laterality Date    EXAM UNDER ANESTHESIA RECTUM N/A 12/22/2023    Procedure: Exam under anesthesia rectum;  Surgeon: Britni Bond MD;  Location: [facility] OR    INCISION AND DRAINAGE RECTUM, COMBINED N/A 12/22/2023    Procedure: EXAM UNDER ANESTHESIA, DRAINAGE OF RECTAL ABCESS;  Surgeon: Britni Bond MD;  Location: [facility] OR       Prior to Admission Medications   Prior to Admission Medications   Prescriptions Last Dose Informant Patient Reported? Taking?   oxyCODONE (ROXICODONE) 5 MG tablet   No No   Sig: Take 1 tablet (5 mg) by mouth every 4 hours as needed for moderate to severe pain      Facility-Administered Medications: None          Physical Exam   Vital Signs: Temp: 98.2  F (36.8  C) Temp src: Temporal BP: 114/67 Pulse: 83   Resp: 18 SpO2: 98 % O2 Device: None (Room air)    Weight: 125 lbs 10.6 oz    General Appearance: Alert and oriented x 3  Respiratory: Clear to auscultation bilaterally  Cardiovascular: RRR without murmur  GI: Bowel sounds are present without tenderness  Skin: No rashes or open sores are noted  Other: No lower extremity swelling noted    Medical Decision Making       55 MINUTES SPENT BY ME on the date of service doing chart review, history, exam,  documentation & further activities per the note.      Data     I have personally reviewed the following data over the past 24 hrs:    10.3  \   9.8 (L)   / 187     136 103 12.0 /  88   3.9 22 0.58 \       Imaging results reviewed over the past 24 hrs:   Recent Results (from the past 24 hours)   CT Pelvis Soft Tissue w Contrast    Narrative    EXAM: CT PELVIS SOFT TISSUE W CONTRAST  LOCATION: [facility]  DATE: 1/22/2025    INDICATION: right sided perirectal pain, swelling, redness, hx of perirectal abscess  COMPARISON: 2/17/2023  TECHNIQUE: CT scan of the pelvis was performed with IV contrast. Multiplanar reformats were obtained. Dose reduction techniques were used.  CONTRAST: 63 mL Isovue 370    FINDINGS:    PELVIC ORGANS: A fluid collection in the right perianal space measuring 2.3 x 1.4 x 2.6 cm (series 4, image 1 6 and series 5, image 44). Thin linear tract extends from the fluid collection superiorly and abuts the right aspect of the upper anal   canal/anorectal junction. Mild inflammatory changes in the fat in the right gluteal region.     The visualized loops of small bowel and colon are normal in caliber. Intrauterine device in the central uterus. Left ovarian 3.5 x 3.1 cm cyst requiring no follow-up in the premenopausal age.    MUSCULOSKELETAL: Unremarkable.      Impression    IMPRESSION:  1.  A 2.6 x 2.3 x 1.4 cm right perianal abscess. A linear tract extends superiorly from the abscess and abuts the right upper anal canal/anorectal junction. Consider a follow-up pelvic MRI to evaluate for fistula.

## 2025-01-22 NOTE — ED NOTES
Emergency Department Attending Supervision Note  1/22/2025  3:56 PM      I evaluated this patient in conjunction with SRINIVASA Alexander      Briefly, the patient presented with a several day history of pain over the right buttock similar to when she had a previous abscess      On my exam, it was chaperoned by female  There was tenderness erythema and swelling over the right buttock    Results: CT demonstrated a 2.6 x 2.3 x 1.4 cm right perianal abscess tracking superiorly    ED course: Dr. Osullivan of colorectal was consulted the patient will be admitted for definitive management    My impression is a perirectal abscess requiring surgical intervention.        Diagnosis    ICD-10-CM    1. Perirectal abscess  K61.1                    Jelani Barros MD  01/22/25 1733

## 2025-01-22 NOTE — ED TRIAGE NOTES
Patient c/o vaginal swelling and pain. States its been going on for 2 day. Right sided. Has had this same thing happen last year. Perianal abscess and had to have surgery to remove. ABCs intact. VSS.      Triage Assessment (Adult)       Row Name 01/22/25 1220          Triage Assessment    Airway WDL WDL        Respiratory WDL    Respiratory WDL WDL        Skin Circulation/Temperature WDL    Skin Circulation/Temperature WDL WDL        Cardiac WDL    Cardiac WDL WDL        Peripheral/Neurovascular WDL    Peripheral Neurovascular WDL WDL

## 2025-01-22 NOTE — CONSULTS
[facility]   Consultation      Place of Service: [facility]  Reason for Consultation: Perianal abscess   Consult Requested by: Zeb Lopes PA-C    History of Present Illness: Jodee Burdick is a 38 yo F with history of perianal abscess. She was seen earlier today by me in clinic with the aid an [employer] phone . She notes that around 2 days ago she began to have worsening perianal pain. She rates the pain a 10/10. She feels the area is swollen and feels similar to her previous abscess drained by Dr. Bond in 12/2023. She denies any drainage or fever. She has not eaten since 8 pm last night, but had some water this morning at 9 am. She feels the pain does not improve with ibuprofen.  ?  In the ED CT abd/pelvis w/ showed a 2.6 x 2.3 x 1.4 cm right perianal abscess. A linear tract extends superiorly from the abscess and abuts the right upper anal canal/anorectal junction. Hgb 9.8, WBC 10.3, vitally stable.         Pertinent Labs:   Lab Results: personally reviewed   Lab Results   Component Value Date     01/22/2025    CO2 22 01/22/2025    BUN 12.0 01/22/2025     Lab Results   Component Value Date    WBC 10.3 01/22/2025    HGB 9.8 01/22/2025    HCT 31.0 01/22/2025    MCV 71 01/22/2025     01/22/2025       Pertinent Radiology:     EXAM: CT PELVIS SOFT TISSUE W CONTRAST  LOCATION: [facility]  DATE: 1/22/2025     INDICATION: right sided perirectal pain, swelling, redness, hx of perirectal abscess  COMPARISON: 2/17/2023  TECHNIQUE: CT scan of the pelvis was performed with IV contrast. Multiplanar reformats were obtained. Dose reduction techniques were used.  CONTRAST: 63 mL Isovue 370     FINDINGS:     PELVIC ORGANS: A fluid collection in the right perianal space measuring 2.3 x 1.4 x 2.6 cm (series 4, image 1 6 and series 5, image 44). Thin linear tract extends from the fluid collection superiorly and abuts the right aspect of the upper anal   canal/anorectal  junction. Mild inflammatory changes in the fat in the right gluteal region.      The visualized loops of small bowel and colon are normal in caliber. Intrauterine device in the central uterus. Left ovarian 3.5 x 3.1 cm cyst requiring no follow-up in the premenopausal age.     MUSCULOSKELETAL: Unremarkable.                                                                      IMPRESSION:  1.  A 2.6 x 2.3 x 1.4 cm right perianal abscess. A linear tract extends superiorly from the abscess and abuts the right upper anal canal/anorectal junction. Consider a follow-up pelvic MRI to evaluate for fistula.           No past medical history on file.    Past Surgical History:   Procedure Laterality Date    EXAM UNDER ANESTHESIA RECTUM N/A 12/22/2023    Procedure: Exam under anesthesia rectum;  Surgeon: Britni Bond MD;  Location: [facility] OR    INCISION AND DRAINAGE RECTUM, COMBINED N/A 12/22/2023    Procedure: EXAM UNDER ANESTHESIA, DRAINAGE OF RECTAL ABCESS;  Surgeon: Britni Bond MD;  Location: [facility] OR       No family history on file.    Social History     Socioeconomic History    Marital status:      Spouse name: Not on file    Number of children: Not on file    Years of education: Not on file    Highest education level: Not on file   Occupational History    Not on file   Tobacco Use    Smoking status: Never    Smokeless tobacco: Never   Substance and Sexual Activity    Alcohol use: Never    Drug use: Not on file    Sexual activity: Not on file   Other Topics Concern    Not on file   Social History Narrative    Not on file     Social Drivers of Health     Financial Resource Strain: High Risk (12/23/2021)    Received from [facility]    Financial Resource Strain     Difficulty of Paying Living Expenses: Not on file     Difficulty of Paying Living Expenses: Not on file   Food Insecurity: Not on file   Transportation Needs: Not on  file   Physical Activity: Not on file   Stress: Not on file   Social Connections: Unknown (12/23/2021)    Received from [facility]    Social Connections     Frequency of Communication with Friends and Family: Not on file   Interpersonal Safety: Not on file   Housing Stability: Not on file       Current Facility-Administered Medications   Medication Dose Route Frequency Provider Last Rate Last Admin    HYDROmorphone (PF) (DILAUDID) injection 0.5 mg  0.5 mg Intravenous Q30 Min PRN Zeb Lopes PA-C   0.5 mg at 01/22/25 1440    piperacillin-tazobactam (ZOSYN) 4.5 g vial to attach to  mL bag  4.5 g Intravenous Once Zeb Lopes PA-C         Current Outpatient Medications   Medication Sig Dispense Refill    oxyCODONE (ROXICODONE) 5 MG tablet Take 1 tablet (5 mg) by mouth every 4 hours as needed for moderate to severe pain 10 tablet 0       No Known Allergies      Intake/Output past 24 hrs:  No intake or output data in the 24 hours ending 01/22/25 1623    Physical Exam:  Temp:  [98.2  F (36.8  C)] 98.2  F (36.8  C)  Pulse:  [83] 83  Resp:  [18] 18  BP: (114)/(67) 114/67  SpO2:  [98 %] 98 %    General: NAD, alert, cooperative  Head: normocephalic, without abnormality / atraumatic  Respiratory: non-labored breathing  Abdomen: soft, non-tender, non-distended.  No guarding or rebound  Perianal/Rectal: Declined  Skin: no rashes or lesions  Musculoskeletal: moves all four extremities equally; no calf edema or tenderness  Psychological: alert and oriented, answers questions appropriately  Neurological: cranial nerves grossly intact    Assessment/Plan: Jodee Burdick is a 38 yo F with history of perianal abscess. She was seen earlier today by me in clinic with the aid an [place] phone . She notes that around 2 days ago she began to have worsening perianal pain. CT abd/pelvis w/ showed a 2.6 x 2.3 x 1.4 cm right perianal abscess. A linear tract extends superiorly from the abscess  and abuts the right upper anal canal/anorectal junction. Hgb 9.8, WBC 10.3, vitally stable.       1. NPO, for possible EUA later today vs tomorrow  2. IV zosyn  3. Admit to hospitalist    Medical Decision Making:   Additional workup / testing ordered: None at this time  Procedure / Surgery recommended: EUA w/ drainage of perianal abscess   Old records reviewed - Old notes, imaging, ED notes, labs  Medications added / changed: None    This case was discussed with: Dr. Stokes and Dr. Doran    Thank you for consulting [facility] regarding this patient's care. Please contact us with questions or concerns.     Today's total time spent including direct patient care and management, and care coordination: 60 minutes    Alison Ventura PA-C  [facility]  [phone]      Discussed with the PA. Incision and drainage later today. NPO for now. Pain control.   Stephanie Doran MD    [facility]  [address], [address]  [place] [address]  T: [phone]  F: [phone]  Pager: [phone]      For questions/paging, please contact the CRS office at [phone].       1/22/2025  6:30 PM

## 2025-01-22 NOTE — ED PROVIDER NOTES
Emergency Department Note      History of Present Illness     Chief Complaint   Perianal problem    Maltese  was used.    NURYS   Jodee Burdick is a 39 year old female with a history of perianal abscess who presents to the emergency department for a perianal problem. The patient states that 2 days ago, she began experiencing perianal pain and swelling, noting that this symptom presentation is reminiscent of a perianal abscess that she has experienced in the past in which she underwent an incision and drainage of this abscess on 12/22/23. She took ibuprofen at 0100 this morning for pain management. No purulent drainage from this abscess. No changes to stool. No black or bloody stools. No fever or chills. No abdominal pain, nausea, or vomiting. No vaginal symptoms. No urinary symptoms. No concern for pregnancy. She adds that she has not eaten today.    Independent Historian   None    Review of External Notes   None    Past Medical History     Medical History and Problem List   MDD  Chemical-induced asthma  Hemorrhoids  Perianal abscess    Medications   Oxycodone    Surgical History   Exam under anesthesia, rectum  I&D rectum    Physical Exam     Patient Vitals for the past 24 hrs:   BP Temp Temp src Pulse Resp SpO2 Weight   01/22/25 1219 114/67 98.2  F (36.8  C) Temporal 83 18 98 % 57 kg (125 lb 10.6 oz)     Physical Exam  Constitutional: Pleasant. Cooperative.   Eyes: Pupils equally round and reactive  HENT: Head is normal in appearance. Oropharynx is normal with moist mucus membranes.  Cardiovascular: Regular rate and rhythm and without murmurs.  Respiratory: Normal respiratory effort, lungs are clear bilaterally.  GI: Abdomen is soft, non-tender, non-distended. No guarding, rebound, or rigidity.  Musculoskeletal: No asymmetry of the lower extremities.  Skin: Normal, without rash.  Neurologic: Cranial nerves grossly intact, normal cognition, no focal deficits. Alert and oriented x 3.   Psychiatric: Normal  affect.  Rectal (Chaperoned)   Normal tone   Area of exquisite tenderness, mild erythema, warmth noted at 3 o'clock of rectum. No associated pustule noted.   No gross blood noted   No external hemorrhoids noted   No anal fissures noted  Nursing notes and vital signs reviewed.    Diagnostics     Lab Results   Labs Ordered and Resulted from Time of ED Arrival to Time of ED Departure   CBC WITH PLATELETS AND DIFFERENTIAL - Abnormal       Result Value    WBC Count 10.3      RBC Count 4.36      Hemoglobin 9.8 (*)     Hematocrit 31.0 (*)     MCV 71 (*)     MCH 22.5 (*)     MCHC 31.6      RDW 16.3 (*)     Platelet Count 187      % Neutrophils 72      % Lymphocytes 18      % Monocytes 7      % Eosinophils 2      % Basophils 0      % Immature Granulocytes 0      NRBCs per 100 WBC 0      Absolute Neutrophils 7.5      Absolute Lymphocytes 1.9      Absolute Monocytes 0.8      Absolute Eosinophils 0.2      Absolute Basophils 0.0      Absolute Immature Granulocytes 0.0      Absolute NRBCs 0.0     BASIC METABOLIC PANEL - Normal    Sodium 136      Potassium 3.9      Chloride 103      Carbon Dioxide (CO2) 22      Anion Gap 11      Urea Nitrogen 12.0      Creatinine 0.58      GFR Estimate >90      Calcium 9.0      Glucose 88     HCG QUALITATIVE URINE - Normal    hCG Urine Qualitative Negative       Imaging   CT Pelvis Soft Tissue w Contrast   Final Result   IMPRESSION:   1.  A 2.6 x 2.3 x 1.4 cm right perianal abscess. A linear tract extends superiorly from the abscess and abuts the right upper anal canal/anorectal junction. Consider a follow-up pelvic MRI to evaluate for fistula.           Independent Interpretation   None    ED Course      Medications Administered   Medications   HYDROmorphone (PF) (DILAUDID) injection 0.5 mg (0.5 mg Intravenous $Given 1/22/25 1440)   piperacillin-tazobactam (ZOSYN) 4.5 g vial to attach to  mL bag (has no administration in time range)   sodium chloride (PF) 0.9% PF flush 100 mL (56 mLs  Intravenous $Given 1/22/25 1520)   iopamidol (ISOVUE-370) solution 500 mL (63 mLs Intravenous $Given 1/22/25 1520)     Discussion of Management   Admitting Hospitalist, Mary Mcclain  Colorectal Surgery     ED Course   ED Course as of 01/22/25 1629   Wed Jan 22, 2025   1404 I obtained history and examined the patient as noted above.      1412 I performed female-chaperoned pelvic exam as noted above in the exam.     1553 Spoke with colorectal surgery ALICIA, regarding the patient.     1609 Spoke with Mary Mcclain, hospitalist ALICIA, admitting for Dr. Olivares. Patient accepted for admission.     1614 I rechecked the patient, I explained findings and plan of care of the patient.        Additional Documentation  None    Medical Decision Making / Diagnosis     CMS Diagnoses: None    MIPS   None    MDM   Jodee Burdick is a 39 year old female who presents to the ED for evaluation of rectal pain over the past 2 days.  Patient has a history of perianal abscesses, last one occurred 1 year ago and she had to go to the OR for this.  She does endorse passing some purulence from her rectum with stool, denies any discharge from the area of tenderness.  See HPI as above for additional details.  Vitals and physical exam as above.  No obvious pustule noted on my exam.  Workup obtained as above concerning for right perianal abscess with linear tract extending superiorly.  See results as above for additional details.  Lab work reassuring overall as above.  Case discussed with colon and rectal, who recommended admission for the patient with likely OR for definitive management.  Melissa requested, this was ordered as above.  Case discussed with hospitalist, agreed to admit the patient.  All questions answered.  Patient admitted in stable condition.    Disposition   The patient was admitted to the hospital.    Diagnosis     ICD-10-CM    1. Perirectal abscess  K61.1         Scribe Disclosure:  Raghu CRAVEN, am serving as a scribe at 2:12 PM on  1/22/2025 to document services personally performed by Zeb Lopes PA-C based on my observations and the provider's statements to me.     This record was created at least in part using electronic voice recognition software, so please excuse any typographical errors.       Zeb Lopes PA-C  01/22/25 6785

## 2025-01-23 ENCOUNTER — PATIENT OUTREACH (OUTPATIENT)
Dept: ONCOLOGY | Facility: CLINIC | Age: 40
End: 2025-01-23
Payer: COMMERCIAL

## 2025-01-23 VITALS
HEIGHT: 61 IN | DIASTOLIC BLOOD PRESSURE: 51 MMHG | RESPIRATION RATE: 15 BRPM | HEART RATE: 63 BPM | BODY MASS INDEX: 23.73 KG/M2 | TEMPERATURE: 98.5 F | OXYGEN SATURATION: 98 % | WEIGHT: 125.66 LBS | SYSTOLIC BLOOD PRESSURE: 89 MMHG

## 2025-01-23 PROBLEM — R71.8 MICROCYTOSIS: Status: ACTIVE | Noted: 2025-01-23

## 2025-01-23 LAB
ANION GAP SERPL CALCULATED.3IONS-SCNC: 12 MMOL/L (ref 7–15)
BUN SERPL-MCNC: 9.2 MG/DL (ref 6–20)
CALCIUM SERPL-MCNC: 8.6 MG/DL (ref 8.8–10.4)
CHLORIDE SERPL-SCNC: 107 MMOL/L (ref 98–107)
CREAT SERPL-MCNC: 0.59 MG/DL (ref 0.51–0.95)
EGFRCR SERPLBLD CKD-EPI 2021: >90 ML/MIN/1.73M2
ERYTHROCYTE [DISTWIDTH] IN BLOOD BY AUTOMATED COUNT: 16.4 % (ref 10–15)
GLUCOSE SERPL-MCNC: 120 MG/DL (ref 70–99)
HCO3 SERPL-SCNC: 21 MMOL/L (ref 22–29)
HCT VFR BLD AUTO: 28.6 % (ref 35–47)
HGB BLD-MCNC: 9.3 G/DL (ref 11.7–15.7)
MCH RBC QN AUTO: 23.1 PG (ref 26.5–33)
MCHC RBC AUTO-ENTMCNC: 32.5 G/DL (ref 31.5–36.5)
MCV RBC AUTO: 71 FL (ref 78–100)
PLATELET # BLD AUTO: 155 10E3/UL (ref 150–450)
POTASSIUM SERPL-SCNC: 4.4 MMOL/L (ref 3.4–5.3)
RBC # BLD AUTO: 4.02 10E6/UL (ref 3.8–5.2)
SODIUM SERPL-SCNC: 140 MMOL/L (ref 135–145)
WBC # BLD AUTO: 7.7 10E3/UL (ref 4–11)

## 2025-01-23 PROCEDURE — 99238 HOSP IP/OBS DSCHRG MGMT 30/<: CPT | Performed by: INTERNAL MEDICINE

## 2025-01-23 PROCEDURE — 250N000011 HC RX IP 250 OP 636: Performed by: SURGERY

## 2025-01-23 PROCEDURE — 85041 AUTOMATED RBC COUNT: CPT | Performed by: PHYSICIAN ASSISTANT

## 2025-01-23 PROCEDURE — 250N000013 HC RX MED GY IP 250 OP 250 PS 637: Performed by: SURGERY

## 2025-01-23 PROCEDURE — 250N000011 HC RX IP 250 OP 636: Performed by: ANESTHESIOLOGY

## 2025-01-23 PROCEDURE — 85014 HEMATOCRIT: CPT | Performed by: PHYSICIAN ASSISTANT

## 2025-01-23 PROCEDURE — 96376 TX/PRO/DX INJ SAME DRUG ADON: CPT

## 2025-01-23 PROCEDURE — 36415 COLL VENOUS BLD VENIPUNCTURE: CPT | Performed by: PHYSICIAN ASSISTANT

## 2025-01-23 PROCEDURE — 96375 TX/PRO/DX INJ NEW DRUG ADDON: CPT

## 2025-01-23 PROCEDURE — 80048 BASIC METABOLIC PNL TOTAL CA: CPT | Performed by: PHYSICIAN ASSISTANT

## 2025-01-23 PROCEDURE — G0378 HOSPITAL OBSERVATION PER HR: HCPCS

## 2025-01-23 PROCEDURE — 250N000011 HC RX IP 250 OP 636: Performed by: PHYSICIAN ASSISTANT

## 2025-01-23 PROCEDURE — 258N000003 HC RX IP 258 OP 636: Performed by: SURGERY

## 2025-01-23 RX ORDER — ONDANSETRON 2 MG/ML
4 INJECTION INTRAMUSCULAR; INTRAVENOUS EVERY 30 MIN PRN
Status: DISCONTINUED | OUTPATIENT
Start: 2025-01-23 | End: 2025-01-23 | Stop reason: HOSPADM

## 2025-01-23 RX ORDER — OXYCODONE HYDROCHLORIDE 10 MG/1
10 TABLET ORAL
Status: DISCONTINUED | OUTPATIENT
Start: 2025-01-23 | End: 2025-01-23

## 2025-01-23 RX ORDER — OXYCODONE HYDROCHLORIDE 5 MG/1
5 TABLET ORAL EVERY 6 HOURS PRN
Qty: 12 TABLET | Refills: 0 | Status: SHIPPED | OUTPATIENT
Start: 2025-01-23 | End: 2025-01-26

## 2025-01-23 RX ORDER — OXYCODONE HYDROCHLORIDE 5 MG/1
5 TABLET ORAL
Status: DISCONTINUED | OUTPATIENT
Start: 2025-01-23 | End: 2025-01-23

## 2025-01-23 RX ORDER — NALOXONE HYDROCHLORIDE 0.4 MG/ML
0.1 INJECTION, SOLUTION INTRAMUSCULAR; INTRAVENOUS; SUBCUTANEOUS
Status: DISCONTINUED | OUTPATIENT
Start: 2025-01-23 | End: 2025-01-23 | Stop reason: HOSPADM

## 2025-01-23 RX ORDER — DEXAMETHASONE SODIUM PHOSPHATE 4 MG/ML
4 INJECTION, SOLUTION INTRA-ARTICULAR; INTRALESIONAL; INTRAMUSCULAR; INTRAVENOUS; SOFT TISSUE
Status: DISCONTINUED | OUTPATIENT
Start: 2025-01-23 | End: 2025-01-23

## 2025-01-23 RX ORDER — ONDANSETRON 4 MG/1
4 TABLET, ORALLY DISINTEGRATING ORAL EVERY 30 MIN PRN
Status: DISCONTINUED | OUTPATIENT
Start: 2025-01-23 | End: 2025-01-23 | Stop reason: HOSPADM

## 2025-01-23 RX ADMIN — SODIUM CHLORIDE, POTASSIUM CHLORIDE, SODIUM LACTATE AND CALCIUM CHLORIDE: 600; 310; 30; 20 INJECTION, SOLUTION INTRAVENOUS at 00:00

## 2025-01-23 RX ADMIN — ACETAMINOPHEN 1000 MG: 500 TABLET, FILM COATED ORAL at 06:21

## 2025-01-23 RX ADMIN — KETOROLAC TROMETHAMINE 15 MG: 15 INJECTION, SOLUTION INTRAMUSCULAR; INTRAVENOUS at 06:27

## 2025-01-23 RX ADMIN — PROCHLORPERAZINE EDISYLATE 5 MG: 5 INJECTION INTRAMUSCULAR; INTRAVENOUS at 01:08

## 2025-01-23 RX ADMIN — PIPERACILLIN AND TAZOBACTAM 3.38 G: 3; .375 INJECTION, POWDER, FOR SOLUTION INTRAVENOUS at 06:21

## 2025-01-23 ASSESSMENT — ACTIVITIES OF DAILY LIVING (ADL)
ADLS_ACUITY_SCORE: 53
ADLS_ACUITY_SCORE: 53
ADLS_ACUITY_SCORE: 41
ADLS_ACUITY_SCORE: 53
ADLS_ACUITY_SCORE: 41
ADLS_ACUITY_SCORE: 53

## 2025-01-23 NOTE — ANESTHESIA PREPROCEDURE EVALUATION
Anesthesia Pre-Procedure Evaluation    Patient: Jodee Burdick   MRN: [id number] : 1985        Procedure : Procedure(s):  INCISION AND DRAINAGE, RECTUM          History reviewed. No pertinent past medical history.   Past Surgical History:   Procedure Laterality Date    EXAM UNDER ANESTHESIA RECTUM N/A 2023    Procedure: Exam under anesthesia rectum;  Surgeon: Britni Bond MD;  Location: [facility] OR    INCISION AND DRAINAGE RECTUM, COMBINED N/A 2023    Procedure: EXAM UNDER ANESTHESIA, DRAINAGE OF RECTAL ABCESS;  Surgeon: Britni Bond MD;  Location: [facility] OR      Allergies   Allergen Reactions    Pineapple Swelling      Social History     Tobacco Use    Smoking status: Never    Smokeless tobacco: Never   Substance Use Topics    Alcohol use: Never      Wt Readings from Last 1 Encounters:   25 57 kg (125 lb 10.6 oz)        Anesthesia Evaluation            ROS/MED HX  ENT/Pulmonary:     (+)                tobacco use, Current use,                       Neurologic:  - neg neurologic ROS     Cardiovascular:  - neg cardiovascular ROS     METS/Exercise Tolerance:     Hematologic:     (+)      anemia,          Musculoskeletal:  - neg musculoskeletal ROS     GI/Hepatic:  - neg GI/hepatic ROS     Renal/Genitourinary:  - neg Renal ROS     Endo:  - neg endo ROS     Psychiatric/Substance Use:  - neg psychiatric ROS     Infectious Disease: Comment: Perirectal abscess       Malignancy:       Other:            Physical Exam    Airway        Mallampati: II   TM distance: > 3 FB   Neck ROM: full   Mouth opening: > 3 cm    Respiratory Devices and Support         Dental       (+) Minor Abnormalities - some fillings, tiny chips      Cardiovascular          Rhythm and rate: regular and normal     Pulmonary           breath sounds clear to auscultation           OUTSIDE LABS:  CBC:   Lab Results   Component Value Date    WBC 10.3 2025    HGB 9.8 (L) 2025    HCT 31.0 (L) 2025      "01/22/2025     BMP:   Lab Results   Component Value Date     01/22/2025    POTASSIUM 3.9 01/22/2025    CHLORIDE 103 01/22/2025    CO2 22 01/22/2025    BUN 12.0 01/22/2025    CR 0.58 01/22/2025    GLC 88 01/22/2025     COAGS: No results found for: \"PTT\", \"INR\", \"FIBR\"  POC:   Lab Results   Component Value Date    HCG Negative 01/22/2025     HEPATIC: No results found for: \"ALBUMIN\", \"PROTTOTAL\", \"ALT\", \"AST\", \"GGT\", \"ALKPHOS\", \"BILITOTAL\", \"BILIDIRECT\", \"TASNEEM\"  OTHER:   Lab Results   Component Value Date    BENI 9.0 01/22/2025       Anesthesia Plan    ASA Status:  2    NPO Status:  NPO Appropriate    Anesthesia Type: General.     - Airway: ETT   Induction: Intravenous.   Maintenance: Balanced.        Consents    Anesthesia Plan(s) and associated risks, benefits, and realistic alternatives discussed. Questions answered and patient/representative(s) expressed understanding.     - Discussed:     - Discussed with:  Patient      - Extended Intubation/Ventilatory Support Discussed: No.      - Patient is DNR/DNI Status: No     Use of blood products discussed: No .     Postoperative Care    Pain management: IV analgesics, Oral pain medications, Multi-modal analgesia.   PONV prophylaxis: Dexamethasone or Solumedrol, Ondansetron (or other 5HT-3)     Comments:               Jaskaran Mcclain MD        Clinically Significant Risk Factors Present on Admission                        # Anemia: based on hgb <11                    "

## 2025-01-23 NOTE — PHARMACY-ADMISSION MEDICATION HISTORY
Pharmacist Admission Medication History    Admission medication history is complete. The information provided in this note is only as accurate as the sources available at the time of the update.    Information Source(s): Patient via in-person    Pertinent Information: took Ibuprofen last night    Changes made to PTA medication list:  Added: None  Deleted: Oxycodone prn- #10 tabs, Rx from year 2023  Changed: None    Allergies reviewed with patient and updates made in EHR: no    Medication History Completed By: Cedrick Iraheta RPH 1/22/2025 7:03 PM    No outpatient medications have been marked as taking for the 1/22/25 encounter (Hospital Encounter).     
Attending Attestation (For Attendings USE Only)...

## 2025-01-23 NOTE — PLAN OF CARE
"Goal Outcome Evaluation:      Plan of Care Reviewed With: patient    Overall Patient Progress: improvingOverall Patient Progress: improving    VSS. RA. CAPNO overnight post op. Got to floor around 0030. Minimal pain, scheduled Toradol and tylenol given. Compazine x1 for nausea, no emesis. Void x1. Perirectal abscess dressed with kerlix, replaced due to moderate serosanguinous drainage, pt tolerated fairly well. LR at 50 mL/hr infusing. Sleeping between cares.     Problem: Adult Inpatient Plan of Care  Goal: Plan of Care Review  Description: The Plan of Care Review/Shift note should be completed every shift.  The Outcome Evaluation is a brief statement about your assessment that the patient is improving, declining, or no change.  This information will be displayed automatically on your shift  note.  1/23/2025 0759 by Aspen Pandey, RN  Outcome: Progressing  Flowsheets (Taken 1/23/2025 0759)  Plan of Care Reviewed With: patient  Overall Patient Progress: improving  1/23/2025 0242 by sApen Pandey, RN  Outcome: Progressing  Goal: Patient-Specific Goal (Individualized)  Description: You can add care plan individualizations to a care plan. Examples of Individualization might be:  \"Parent requests to be called daily at 9am for status\", \"I have a hard time hearing out of my right ear\", or \"Do not touch me to wake me up as it startles  me\".  1/23/2025 0759 by Aspen Pandey, RN  Outcome: Progressing  1/23/2025 0242 by Aspen Pandey, RN  Outcome: Progressing  Goal: Absence of Hospital-Acquired Illness or Injury  1/23/2025 0759 by Aspen Pandey, RN  Outcome: Progressing  1/23/2025 0242 by Aspen Pandey, RN  Outcome: Progressing  Intervention: Identify and Manage Fall Risk  Recent Flowsheet Documentation  Taken 1/23/2025 0051 by Aspen Pandey, RN  Safety Promotion/Fall Prevention:   clutter free environment maintained   increased rounding and observation   nonskid shoes/slippers when out of bed   room near nurse's " station   room organization consistent   safety round/check completed  Intervention: Prevent Skin Injury  Recent Flowsheet Documentation  Taken 1/23/2025 0051 by Aspen Pandey RN  Body Position: supine, head elevated  Intervention: Prevent and Manage VTE (Venous Thromboembolism) Risk  Recent Flowsheet Documentation  Taken 1/23/2025 0051 by Aspen Pandey RN  VTE Prevention/Management: patient refused intervention  Intervention: Prevent Infection  Recent Flowsheet Documentation  Taken 1/23/2025 0051 by Aspen Pandey RN  Infection Prevention:   equipment surfaces disinfected   hand hygiene promoted   rest/sleep promoted   single patient room provided  Goal: Optimal Comfort and Wellbeing  1/23/2025 0759 by Aspen Pandey RN  Outcome: Progressing  1/23/2025 0242 by Aspen Pandey RN  Outcome: Progressing  Goal: Readiness for Transition of Care  1/23/2025 0759 by Aspen Pandey RN  Outcome: Progressing  1/23/2025 0242 by Aspen Pandey RN  Outcome: Progressing     Problem: Skin or Soft Tissue Infection  Goal: Absence of Infection Signs and Symptoms  1/23/2025 0759 by Aspen Pandey RN  Outcome: Progressing  1/23/2025 0242 by Aspen Pandey RN  Outcome: Progressing  Intervention: Minimize and Manage Infection Progression  Recent Flowsheet Documentation  Taken 1/23/2025 0051 by Aspen Pandey RN  Infection Prevention:   equipment surfaces disinfected   hand hygiene promoted   rest/sleep promoted   single patient room provided

## 2025-01-23 NOTE — ANESTHESIA CARE TRANSFER NOTE
Patient: Manar S Nasr    Procedure: Procedure(s):  INCISION AND DRAINAGE, RECTUM       Diagnosis: Perirectal abscess [K61.1]  Diagnosis Additional Information: No value filed.    Anesthesia Type:   General     Note:    Oropharynx: oropharynx clear of all foreign objects and spontaneously breathing  Level of Consciousness: awake  Oxygen Supplementation: face mask  Level of Supplemental Oxygen (L/min / FiO2): 10  Independent Airway: airway patency satisfactory and stable  Dentition: dentition unchanged  Vital Signs Stable: post-procedure vital signs reviewed and stable  Report to RN Given: handoff report given  Patient transferred to: PACU    Handoff Report: Identifed the Patient, Identified the Reponsible Provider, Reviewed the pertinent medical history, Discussed the surgical course, Reviewed Intra-OP anesthesia mangement and issues during anesthesia, Set expectations for post-procedure period and Allowed opportunity for questions and acknowledgement of understanding      Vitals:  Vitals Value Taken Time   /52 01/22/25 2250   Temp 98.24  F (36.8  C) 01/22/25 2252   Pulse 81 01/22/25 2252   Resp 33 01/22/25 2252   SpO2 100 % 01/22/25 2252   Vitals shown include unfiled device data.    Electronically Signed By: ASHA Mcbride CRNA  January 22, 2025  10:53 PM

## 2025-01-23 NOTE — PROGRESS NOTES
Hematology referral reviewed for [facility], see below.    Referral reason: referral received from discharging provider at [place] for ongoing anemia with h/o iron deficiency, in hospital for perianal abscess    Clinical question entered by referring provider or through order transcription: microcytic anemia     Referral received via: Internal referral by [facility] Discharging Medicine/Hospitalist Provider    Current abnormal labs: Available in Chart Review    Plan: Triage instructions updated and sent to [facility] for completion.

## 2025-01-23 NOTE — ANESTHESIA POSTPROCEDURE EVALUATION
Patient: Ianar S Nasr    Procedure: Procedure(s):  INCISION AND DRAINAGE, RECTUM       Anesthesia Type:  General    Note:  Disposition: Inpatient   Postop Pain Control: Uneventful            Sign Out: Well controlled pain   PONV: No   Neuro/Psych: Uneventful            Sign Out: Acceptable/Baseline neuro status   Airway/Respiratory: Uneventful            Sign Out: Acceptable/Baseline resp. status   CV/Hemodynamics: Uneventful            Sign Out: Acceptable CV status   Other NRE: NONE   DID A NON-ROUTINE EVENT OCCUR? No           Last vitals:  Vitals Value Taken Time   /50 01/22/25 2345   Temp 98.78  F (37.1  C) 01/22/25 2351   Pulse 68 01/22/25 2351   Resp 12 01/22/25 2351   SpO2 98 % 01/22/25 2351   Vitals shown include unfiled device data.    Electronically Signed By: Jaskaran Mcclain MD  January 22, 2025  11:52 PM

## 2025-01-23 NOTE — DISCHARGE SUMMARY
Hospitalist Discharge Summary  [facility]    Jodee Burdick MRN# [id number]   YOB: 1985 Age: 39 year old     Date of Admission:  1/22/2025  Date of Discharge:  1/23/2025 11:15 AM  Admitting Physician:  Dustin Mckenzie DO  Discharge Physician:  Dustin Mckenzie DO  Discharging Service:  Hospitalist     Primary Provider: [facility]          Discharge Diagnosis:     Recurrent Perirectal Abscess s/p I&D on 1/22/2025  - Appreciate CRS recommendations  - IV zosyn in the hospital.  Plan for 5 days of augmentin at discharge  - Wound care per CRS  - Perirectal tissue biopsy pending  - Pain control prn     Chronic Microcytic Anemia  - Would benefit from hematology evaluation with work up for microcytosis  - Referral placed for hematology             Discharge Disposition:     Discharged to home           Hospital Course:     Jodee Burdick is a 39 year old female with a history of recurrent perirectal abscess, chronic microcytic anemia who was admitted on 1/22/2025 with rectal pain.  In the emergency department, the patient is found to have a temperature of 98.2  F, heart rate 83, blood pressure 114/67, respiratory rate 18, SpO2 98% on room air.  Initial lab work showed hemoglobin 9.8, MCV 71.  The patient was taken by colorectal surgery for incision and drainage of the perirectal abscess on 1/22/2025.  Biopsy was obtained and sent for pathology, which is pending.  On the morning of 1/23, the patient's symptoms were improving and at that point she was stable for discharge home with oral Augmentin for 5 days.     The patient was seen, examined, and counseled on this day. The hospitalization and plan of care was reviewed with the patient extensively. All questions were addressed and the patient agreed to follow-up as noted above.           Allergies:     Allergies   Allergen Reactions    Pineapple Swelling              Discharge Medications:     Discharge Medication  "List as of 1/23/2025 11:00 AM        START taking these medications    Details   oxyCODONE (ROXICODONE) 5 MG tablet Take 1 tablet (5 mg) by mouth every 6 hours as needed for pain., Disp-12 tablet, R-0, Local Print                    Condition on Discharge:     Discharge condition: Fair   Discharge vitals: Blood pressure (!) 89/51, pulse 63, temperature 98.5  F (36.9  C), temperature source Oral, resp. rate 15, height 1.549 m (5' 1\"), weight 57 kg (125 lb 10.6 oz), last menstrual period 01/08/2025, SpO2 98%.   Code status on discharge: Full Code      BASIC PHYSICAL EXAMINATION:  GENERAL: No apparent distress.  CARDIOVASCULAR: Regular rate and rhythm without murmurs.  PULMONARY: Clear to auscultation bilaterally.   GASTROINTESTINAL: Abdomen soft, non-tender.  EXTREMITIES: No edema, pulses intact.  NEUROLOGIC: No focal deficits.            History of Illness:   See detailed admission note for full details.               Procedures excluding imaging which is summarized below:     Please see details in the electronic medical record.           Consultations:     COLORECTAL SURGERY IP CONSULT          Significant Results:     Results for orders placed or performed during the hospital encounter of 01/22/25   CT Pelvis Soft Tissue w Contrast    Narrative    EXAM: CT PELVIS SOFT TISSUE W CONTRAST  LOCATION: [facility]  DATE: 1/22/2025    INDICATION: right sided perirectal pain, swelling, redness, hx of perirectal abscess  COMPARISON: 2/17/2023  TECHNIQUE: CT scan of the pelvis was performed with IV contrast. Multiplanar reformats were obtained. Dose reduction techniques were used.  CONTRAST: 63 mL Isovue 370    FINDINGS:    PELVIC ORGANS: A fluid collection in the right perianal space measuring 2.3 x 1.4 x 2.6 cm (series 4, image 1 6 and series 5, image 44). Thin linear tract extends from the fluid collection superiorly and abuts the right aspect of the upper anal   canal/anorectal junction. Mild " inflammatory changes in the fat in the right gluteal region.     The visualized loops of small bowel and colon are normal in caliber. Intrauterine device in the central uterus. Left ovarian 3.5 x 3.1 cm cyst requiring no follow-up in the premenopausal age.    MUSCULOSKELETAL: Unremarkable.      Impression    IMPRESSION:  1.  A 2.6 x 2.3 x 1.4 cm right perianal abscess. A linear tract extends superiorly from the abscess and abuts the right upper anal canal/anorectal junction. Consider a follow-up pelvic MRI to evaluate for fistula.         Transthoracic Echocardiogram Results:  No results found for this or any previous visit (from the past 4320 hours).             Pending Results:     Unresulted Labs Ordered in the Past 30 Days of this Admission       Date and Time Order Name Status Description    1/22/2025 10:52 PM Surgical Pathology Exam In process                         Discharge Instructions and Follow-Up:     Discharge instructions and follow-up:   Discharge Procedure Orders   Adult Oncology/Hematology  Referral   Standing Status: Future   Referral Priority: Routine Referral Type: Consultation   Requested Specialty: Medical Oncology   Number of Visits Requested: 1     Reason for your hospital stay   Order Comments: Recurrent Perirectal Abscess     Follow-up and recommended labs and tests    Order Comments: Follow up with primary care provider, [facility], within 7 days for hospital follow- up.  The following labs/tests are recommended: CBC.      Recommend you follow up with your colorectal surgeon for biopsy results and further wound cares.  Their office should contact you to set up the appointment.  If you do not hear from them by middle of next week, recommend you contact their office.    Your hemoglobin (red blood cells) are low and have been.  You would benefit from hematology evaluation and a referral was placed.     Activity   Order Comments: Your activity upon discharge:  activity as tolerated     Order Specific Question Answer Comments   Is discharge order? Yes      Diet   Order Comments: Follow this diet upon discharge: Current Diet:Orders Placed This Encounter      Regular Diet Adult     Order Specific Question Answer Comments   Is discharge order? Yes           Total time spent in face to face contact with the patient and coordinating discharge was:  30 Minutes    Dustin Mckenzie DO  [facility] Hospitalist  [address] [place] [address]  01/23/2025

## 2025-01-23 NOTE — PROCEDURES
Operative Note    Preop Dx:  1. Anorectal abscess    Postop Dx:  1. Right anorectal abscess,recurrent    Anesthesia: General Endotracheal anesthesia     Procedure:   Examination of the rectum under anesthesia  Incision and drainage of perianal abscess  Pudendal nerve block      Date of Procedure:  1/22/2025 10:11 PM    Surgeons: Stephanie Doran MD  Assistants: None     Specimens: abscess cavity    Findings: Thickening to right ischiorectal fossa consistent with scarring rather than cellulitis.  10 cc of pus.  Scarring in the right anterolateral dentate line which appears to be possibly chronic fissure or correlates with prior ulceration noted.  No internal opening.  It does not track towards the midline.  It does not track anteriorly.  The rectal mucosa is normal in appearance.    Indications: This is a 39 year old female with history of recurrent perianal abscesses. She is brought to the operating room after developing recurrent rectal pain.  CT scan was performed which demonstrated an abscess in the perianal area.    Description of Procedure:  The appropriate patient was identified and informed consent was obtained. The patient was taken to the operating room and General endotracheal anesthesia was induced.  The patient was placed prone on the operating table.  All pressure points were padded.  The buttocks were taped apart.  The perineum was prepped and draped with Betadine.  A timeout was called to include patient's name date of birth and procedure to be performed.    The procedure began with a pudendal nerve block.  35 mL of a 50-50 mix of 1% lidocaine plain and quarter percent Marcaine with epinephrine were injected first in a britney shape around the anus.  Then towards the ischial rectal spine.  This was palpated and the needle was advanced towards the spine.  Anesthetic was infiltrated after aspirating to confirm that there was no intravascular injection.    A digital rectal exam was then performed.  Normal  resting tone.  No masses.  A Carmona bivalve was inserted into the anal canal.  The findings were noted as above.  There is no purulence expressed.  There was an area of fluctuance in the right ischial rectal fossa that had approximate 1 cm scarred rim.  This appeared to be chronic and fibrotic rather than acute cellulitis or inflammatory scar.  This was sent for biopsy.  The wound was irrigated.  The cavity was probed.  Is quite a large cavity approximately 2 x 2 cm.  Given inside did I did not feel that injecting hydrogen peroxide would allow adequate increase in pressure for it to float into the internal opening.  The wound was gently packed with a corner of a Kerlix gauze sponge.  Dressings were applied to include fluffs and mesh underwear.    The patient tolerated the procedure well with no immediate complications. They were awakened from anesthesia and transferred to the PACU in stable condition. All counts were correct upon completion of the case.        Complications: None.    EBL: 1mL.        Drains/Tubes: None.    Disposition: PACU. Patient may discharge home later this evening if she has a ride, vs in the morning.  Currently her  is home with the children and given the late hour reasonable to monitor overnight for pain control discharge first thing in the morning.  Okay for regular diet.  Coordinate follow-up in 1 to 2 weeks.  Patient may require outpatient imaging to further delineate what is likely a fistula however no internal opening identified.      Stephanie Doran MD    [facility]  [address], [address]  [place] [address]  T: [phone]  F: [phone]  Pager: [phone]        1/22/2025  10:11 PM

## 2025-01-23 NOTE — PLAN OF CARE
"  Problem: Adult Inpatient Plan of Care  Goal: Plan of Care Review  Description: The Plan of Care Review/Shift note should be completed every shift.  The Outcome Evaluation is a brief statement about your assessment that the patient is improving, declining, or no change.  This information will be displayed automatically on your shift  note.  Outcome: Met  Flowsheets (Taken 1/23/2025 1111)  Outcome Evaluation: bp soft, normal for pt.  afebrile. pt up indep in room. drsg changed per pt. Pt d/c to home. d/c instructions reviewed with pt. pt understood all instructions, meds ( pt requested stronger pain meds,script for oxycodone given to pt to fill at her pharmacy. Abx at pt pharmacy also), f/U appts needed. all belongings taken with pt.  Plan of Care Reviewed With: patient  Overall Patient Progress: improving  Goal: Patient-Specific Goal (Individualized)  Description: You can add care plan individualizations to a care plan. Examples of Individualization might be:  \"Parent requests to be called daily at 9am for status\", \"I have a hard time hearing out of my right ear\", or \"Do not touch me to wake me up as it startles  me\".  Outcome: Met  Goal: Absence of Hospital-Acquired Illness or Injury  Outcome: Met  Intervention: Identify and Manage Fall Risk  Recent Flowsheet Documentation  Taken 1/23/2025 0850 by Layne Robles RN  Safety Promotion/Fall Prevention:   clutter free environment maintained   increased rounding and observation   nonskid shoes/slippers when out of bed   room near nurse's station   room organization consistent   safety round/check completed  Intervention: Prevent Skin Injury  Recent Flowsheet Documentation  Taken 1/23/2025 0850 by Layne Robles RN  Body Position: supine, head elevated  Intervention: Prevent Infection  Recent Flowsheet Documentation  Taken 1/23/2025 0850 by Layne Robles RN  Infection Prevention:   equipment surfaces disinfected   hand hygiene promoted   " rest/sleep promoted   single patient room provided  Goal: Optimal Comfort and Wellbeing  Outcome: Met  Goal: Readiness for Transition of Care  Outcome: Met     Problem: Skin or Soft Tissue Infection  Goal: Absence of Infection Signs and Symptoms  Outcome: Met  Intervention: Minimize and Manage Infection Progression  Recent Flowsheet Documentation  Taken 1/23/2025 0846 by Layne Robles RN  Infection Prevention:   equipment surfaces disinfected   hand hygiene promoted   rest/sleep promoted   single patient room provided       Goal Outcome Evaluation:      Plan of Care Reviewed With: patient    Overall Patient Progress: improvingOverall Patient Progress: improving    Outcome Evaluation: bp soft, normal for pt.  afebrile. pt up indep in room. drsg changed per pt. Pt d/c to home. d/c instructions reviewed with pt. pt understood all instructions, meds ( pt requested stronger pain meds,script for oxycodone given to pt to fill at her pharmacy. Abx at pt pharmacy also), f/U appts needed. all belongings taken with pt.

## 2025-01-23 NOTE — PROGRESS NOTES
Provided cares for pt from 1468-7252. Pt a/o x4. C/o perineal pain IV dilaudid given with relief. Antibiotics given. IV fluids started. Pt NPO. Plan for surgery tonight.     /50 (BP Location: Right arm)   Pulse 69   Temp 98.6  F (37  C) (Oral)   Resp 16   Wt 57 kg (125 lb 10.6 oz)   LMP 01/08/2025 (Approximate)   SpO2 100%   BMI 21.57 kg/m

## 2025-01-23 NOTE — ANESTHESIA PROCEDURE NOTES
Airway       Patient location during procedure: OR       Procedure Start/Stop Times: 1/22/2025 10:14 PM  Staff -        CRNA: Ray Gutierrez APRN CRNA       Performed By: CRNAIndications and Patient Condition       Indications for airway management: tootie-procedural and airway protection       Induction type:intravenous       Mask difficulty assessment: 1 - vent by mask    Final Airway Details       Final airway type: endotracheal airway       Successful airway: ETT - single  Endotracheal Airway Details        ETT size (mm): 7.0       Cuffed: yes       Successful intubation technique: direct laryngoscopy       DL Blade Type: MAC 3       Grade View of Cords: 1       Adjucts: stylet       Position: Right       Measured from: gums/teeth       Secured at (cm): 21       Bite block used: None    Post intubation assessment        Placement verified by: capnometry, equal breath sounds and chest rise        Number of attempts at approach: 2       Secured with: tape       Ease of procedure: easy       Dentition: Intact    Medication(s) Administered   Medication Administration Time: 1/22/2025 10:14 PM

## 2025-01-23 NOTE — PROGRESS NOTES
[facility]    Hospitalist Progress Note  Name: Jodee Burdick    MRN: [id number]  Provider:  Dustin Mckenzie DO  Date of Service: 01/23/2025    Summary of Stay: Jodee Burdick is a 39 year old female with a history of recurrent perirectal abscess, chronic microcytic anemia who was admitted on 1/22/2025 with rectal pain.  In the emergency department, the patient is found to have a temperature of 98.2  F, heart rate 83, blood pressure 114/67, respiratory rate 18, SpO2 98% on room air.  Initial lab work showed hemoglobin 9.8, MCV 71.  The patient was taken by colorectal surgery for incision and drainage of the perirectal abscess on 1/22/2025.  Biopsy was obtained and sent for pathology, which is pending.  On the morning of 1/23, the patient's symptoms were improving and at that point she was stable for discharge home with oral Augmentin for 5 days.    TODAY'S PLAN: Appreciate colorectal surgery recommendations.  At this point, the patient is stable for discharge.  Biopsy is pending.  Will plan for 5 days of Augmentin.  Discussed with patient she will need to follow-up with colorectal surgery as an outpatient.  All questions answered.   will pick her up.    Problem List:   Recurrent Perirectal Abscess s/p I&D on 1/22/2025  - Appreciate CRS recommendations  - IV zosyn in the hospital.  Plan for 5 days of augmentin at discharge  - Wound care per CRS  - Perirectal tissue biopsy pending  - Pain control prn    Chronic Microcytic Anemia  - Would benefit from hematology evaluation with work up for microcytosis  - Referral placed for hematology    I spent 43 minutes in reviewing this patient's labs, imaging, medications, medical history.  In addition time was spent interviewing the patient, communicating with family, and medical decision making.      DVT Prophylaxis: Pneumatic Compression Devices  Code Status: Full Code  Diet: Regular Diet Adult    Patterson Catheter: Not present    Disposition: Medically  Ready for Discharge: Ready Now    Goals to discharge include: abx plan established  Family updated today: No     Interval History   Pt seen and examined.  Pt reports mild abd discomfort and slight pain at the surgical site, but much improved from arrival to the ED.    -Data reviewed today: I personally reviewed all new labs and imaging results over the last 24 hours.     Physical Exam   Temp: 98.2  F (36.8  C) Temp src: Oral BP: 90/49 Pulse: 63   Resp: 14 SpO2: 99 % O2 Device: None (Room air) Oxygen Delivery: 6 LPM  Vitals:    01/22/25 1219 01/22/25 1916   Weight: 57 kg (125 lb 10.6 oz) 57 kg (125 lb 10.6 oz)     Vital Signs with Ranges  Temp:  [97.4  F (36.3  C)-99  F (37.2  C)] 98.2  F (36.8  C)  Pulse:  [63-86] 63  Resp:  [12-23] 14  BP: ()/(43-67) 90/49  SpO2:  [97 %-100 %] 99 %  I/O last 3 completed shifts:  In: 500 [I.V.:500]  Out: -     GENERAL: No apparent distress. Awake, alert, and fully oriented.  HEENT: Normocephalic, atraumatic. Extraocular movements intact.  CARDIOVASCULAR: Regular rate and rhythm without murmurs or rubs. No S3.  PULMONARY: Clear bilaterally.  GASTROINTESTINAL: Soft, non-tender, non-distended. Bowel sounds normoactive.   EXTREMITIES: No cyanosis or clubbing. No edema.  NEUROLOGICAL: CN 2-12 grossly intact, no focal neurological deficits.  DERMATOLOGICAL: No rash, ulcer, bruising, nor jaundice.    Medications   Current Facility-Administered Medications   Medication Dose Route Frequency Provider Last Rate Last Admin     Current Facility-Administered Medications   Medication Dose Route Frequency Provider Last Rate Last Admin    acetaminophen (TYLENOL) tablet 1,000 mg  1,000 mg Oral Q6H Stephanie Doran MD   1,000 mg at 01/23/25 0621    ketorolac (TORADOL) injection 15 mg  15 mg Intravenous Q6H Stephanie Doran MD   15 mg at 01/23/25 0627    Followed by    ibuprofen (ADVIL/MOTRIN) tablet 600 mg  600 mg Oral Q6H Stephanie Doran MD        piperacillin-tazobactam (ZOSYN)  "3.375 g vial to attach to  mL bag  3.375 g Intravenous Q6H Irina Mcclain PA-C   3.375 g at 01/23/25 0621    sodium chloride (PF) 0.9% PF flush 3 mL  3 mL Intracatheter Q8H Stephanie Doran MD         Data     Laboratory:  Recent Labs   Lab 01/22/25  1440   WBC 10.3   HGB 9.8*   HCT 31.0*   MCV 71*        Recent Labs   Lab 01/22/25  1440      POTASSIUM 3.9   CHLORIDE 103   CO2 22   ANIONGAP 11   GLC 88   BUN 12.0   CR 0.58   GFRESTIMATED >90   BENI 9.0     No results for input(s): \"CULT\" in the last 168 hours.  No results found for: \"TROPI\"    Imaging:  Recent Results (from the past 24 hours)   CT Pelvis Soft Tissue w Contrast    Narrative    EXAM: CT PELVIS SOFT TISSUE W CONTRAST  LOCATION: [facility]  DATE: 1/22/2025    INDICATION: right sided perirectal pain, swelling, redness, hx of perirectal abscess  COMPARISON: 2/17/2023  TECHNIQUE: CT scan of the pelvis was performed with IV contrast. Multiplanar reformats were obtained. Dose reduction techniques were used.  CONTRAST: 63 mL Isovue 370    FINDINGS:    PELVIC ORGANS: A fluid collection in the right perianal space measuring 2.3 x 1.4 x 2.6 cm (series 4, image 1 6 and series 5, image 44). Thin linear tract extends from the fluid collection superiorly and abuts the right aspect of the upper anal   canal/anorectal junction. Mild inflammatory changes in the fat in the right gluteal region.     The visualized loops of small bowel and colon are normal in caliber. Intrauterine device in the central uterus. Left ovarian 3.5 x 3.1 cm cyst requiring no follow-up in the premenopausal age.    MUSCULOSKELETAL: Unremarkable.      Impression    IMPRESSION:  1.  A 2.6 x 2.3 x 1.4 cm right perianal abscess. A linear tract extends superiorly from the abscess and abuts the right upper anal canal/anorectal junction. Consider a follow-up pelvic MRI to evaluate for fistula.           Dustin Mckenzie DO  [facility] Hospitalist  [address] [place] [address]  Securely message with [employer] (more info)  Text page via [facility] Paging/Directory   01/23/2025

## 2025-01-24 LAB
PATH REPORT.COMMENTS IMP SPEC: NORMAL
PATH REPORT.COMMENTS IMP SPEC: NORMAL
PATH REPORT.FINAL DX SPEC: NORMAL
PATH REPORT.GROSS SPEC: NORMAL
PATH REPORT.MICROSCOPIC SPEC OTHER STN: NORMAL
PATH REPORT.RELEVANT HX SPEC: NORMAL
PHOTO IMAGE: NORMAL

## 2025-01-28 NOTE — TELEPHONE ENCOUNTER
RECORDS STATUS - ALL OTHER DIAGNOSIS      RECORDS RECEIVED FROM: [facility]   NOTES STATUS DETAILS   OFFICE NOTE from referring provider Epic Dr. Dustin Mckenzie   DISCHARGE SUMMARY from hospital [facility] 1/22/2025 - [facility]    OPERATIVE REPORT [facility] 1/22/2025 - INCISION AND DRAINAGE, RECTUM    MEDICATION LIST [facility]    LABS     PATHOLOGY REPORTS [facility] 1/22/2025 - [id number]    ANYTHING RELATED TO DIAGNOSIS Epic 1/23/2025

## 2025-02-10 ENCOUNTER — ANCILLARY PROCEDURE (OUTPATIENT)
Dept: MRI IMAGING | Facility: CLINIC | Age: 40
End: 2025-02-10
Payer: COMMERCIAL

## 2025-02-10 DIAGNOSIS — K60.30 ANAL FISTULA: ICD-10-CM

## 2025-02-10 PROCEDURE — A9585 GADOBUTROL INJECTION: HCPCS | Mod: JW | Performed by: RADIOLOGY

## 2025-02-10 PROCEDURE — 72197 MRI PELVIS W/O & W/DYE: CPT | Mod: TC | Performed by: RADIOLOGY

## 2025-02-10 RX ORDER — GADOBUTROL 604.72 MG/ML
0.1 INJECTION INTRAVENOUS ONCE
Status: COMPLETED | OUTPATIENT
Start: 2025-02-10 | End: 2025-02-10

## 2025-02-10 RX ADMIN — GADOBUTROL 5.5 ML: 604.72 INJECTION INTRAVENOUS at 14:01

## 2025-02-16 ENCOUNTER — HEALTH MAINTENANCE LETTER (OUTPATIENT)
Age: 40
End: 2025-02-16

## 2025-03-16 ENCOUNTER — HEALTH MAINTENANCE LETTER (OUTPATIENT)
Age: 40
End: 2025-03-16

## 2025-04-22 ENCOUNTER — PRE VISIT (OUTPATIENT)
Dept: ONCOLOGY | Facility: CLINIC | Age: 40
End: 2025-04-22
Payer: COMMERCIAL

## (undated) DEVICE — GLOVE BIOGEL PI MICRO SZ 6.5 48565

## (undated) DEVICE — LINEN FULL SHEET 5511

## (undated) DEVICE — DECANTER VIAL 2006S

## (undated) DEVICE — SUCTION TIP YANKAUER W/O VENT K86

## (undated) DEVICE — PANTIES MESH LG/XLG 2PK 706M2

## (undated) DEVICE — GLOVE BIOGEL PI MICRO INDICATOR UNDERGLOVE SZ 7.5 48975

## (undated) DEVICE — GLOVE BIOGEL PI MICRO SZ 7.5 48575

## (undated) DEVICE — PREP POVIDONE IODINE SOLUTION 10% 4OZ BOTTLE 29906-004

## (undated) DEVICE — PREP POVIDONE-IODINE 7.5% SCRUB 4OZ BOTTLE MDS093945

## (undated) DEVICE — TRAY PREP DRY SKIN SCRUB 067

## (undated) DEVICE — PREP DURAPREP 26ML APL 8630

## (undated) DEVICE — SUCTION MANIFOLD NEPTUNE 2 SYS 4 PORT 0702-020-000

## (undated) DEVICE — LINEN TOWEL PACK X10 5473

## (undated) DEVICE — GLOVE BIOGEL PI MICRO SZ 7.0 48570

## (undated) DEVICE — LUBRICATING JELLY 4.25OZ

## (undated) DEVICE — ESU GROUND PAD ADULT W/CORD E7507

## (undated) DEVICE — TUBING SUCTION 12"X1/4" N612

## (undated) DEVICE — SPONGE BALL KERLIX ROUND XL W/O STRING LATEX 4935

## (undated) DEVICE — DRAPE LAP PEDS DISP 29492

## (undated) DEVICE — SYR 05ML LL W/O NDL

## (undated) DEVICE — SOL NACL 0.9% IRRIG 1000ML BOTTLE 2F7124

## (undated) DEVICE — LINEN HALF SHEET 5512

## (undated) DEVICE — SUCTION CANISTER MEDIVAC LINER 3000ML W/LID 65651-530

## (undated) DEVICE — PAD PERI INDIV WRAP 11" 2022A

## (undated) DEVICE — BAG CLEAR TRASH 1.3M 39X33" P4040C

## (undated) DEVICE — NDL 18GA 1.5" 305196

## (undated) DEVICE — SPONGE RAY-TEC 3X3" 30-094

## (undated) DEVICE — PACK MINOR CUSTOM RIDGES SBA32RMRMA

## (undated) DEVICE — SYR BULB IRRIG 50ML LATEX FREE 0035280

## (undated) DEVICE — SOL WATER IRRIG 1000ML BOTTLE 2F7114

## (undated) DEVICE — TAPE CLOTH ADHESIVE 3" LATEX FREE

## (undated) DEVICE — CAST PADDING 4" STERILE 9044S

## (undated) DEVICE — GLOVE BIOGEL PI MICRO INDICATOR UNDERGLOVE SZ 7.0 48970

## (undated) DEVICE — SOL NACL 0.9% INJ 1000ML BAG 2B1324X

## (undated) RX ORDER — FENTANYL CITRATE 50 UG/ML
INJECTION, SOLUTION INTRAMUSCULAR; INTRAVENOUS
Status: DISPENSED
Start: 2025-01-22

## (undated) RX ORDER — LIDOCAINE HYDROCHLORIDE AND EPINEPHRINE 10; 10 MG/ML; UG/ML
INJECTION, SOLUTION INFILTRATION; PERINEURAL
Status: DISPENSED
Start: 2025-01-22

## (undated) RX ORDER — FENTANYL CITRATE 50 UG/ML
INJECTION, SOLUTION INTRAMUSCULAR; INTRAVENOUS
Status: DISPENSED
Start: 2023-12-22

## (undated) RX ORDER — DEXAMETHASONE SODIUM PHOSPHATE 4 MG/ML
INJECTION, SOLUTION INTRA-ARTICULAR; INTRALESIONAL; INTRAMUSCULAR; INTRAVENOUS; SOFT TISSUE
Status: DISPENSED
Start: 2025-01-22

## (undated) RX ORDER — PROPOFOL 10 MG/ML
INJECTION, EMULSION INTRAVENOUS
Status: DISPENSED
Start: 2025-01-22

## (undated) RX ORDER — LIDOCAINE HYDROCHLORIDE 10 MG/ML
INJECTION, SOLUTION EPIDURAL; INFILTRATION; INTRACAUDAL; PERINEURAL
Status: DISPENSED
Start: 2023-12-22

## (undated) RX ORDER — OXYCODONE HYDROCHLORIDE 5 MG/1
TABLET ORAL
Status: DISPENSED
Start: 2023-12-22

## (undated) RX ORDER — LIDOCAINE HYDROCHLORIDE AND EPINEPHRINE 10; 10 MG/ML; UG/ML
INJECTION, SOLUTION INFILTRATION; PERINEURAL
Status: DISPENSED
Start: 2023-12-22

## (undated) RX ORDER — BUPIVACAINE HYDROCHLORIDE 2.5 MG/ML
INJECTION, SOLUTION EPIDURAL; INFILTRATION; INTRACAUDAL
Status: DISPENSED
Start: 2023-12-22

## (undated) RX ORDER — ACETAMINOPHEN 500 MG
TABLET ORAL
Status: DISPENSED
Start: 2025-01-22

## (undated) RX ORDER — ONDANSETRON 2 MG/ML
INJECTION INTRAMUSCULAR; INTRAVENOUS
Status: DISPENSED
Start: 2025-01-22

## (undated) RX ORDER — BUPIVACAINE HYDROCHLORIDE 5 MG/ML
INJECTION, SOLUTION EPIDURAL; INTRACAUDAL
Status: DISPENSED
Start: 2025-01-22

## (undated) RX ORDER — PROPOFOL 10 MG/ML
INJECTION, EMULSION INTRAVENOUS
Status: DISPENSED
Start: 2023-12-22

## (undated) RX ORDER — LIDOCAINE HYDROCHLORIDE 10 MG/ML
INJECTION, SOLUTION EPIDURAL; INFILTRATION; INTRACAUDAL; PERINEURAL
Status: DISPENSED
Start: 2025-01-22